# Patient Record
Sex: FEMALE | Race: ASIAN | NOT HISPANIC OR LATINO | Employment: OTHER | URBAN - METROPOLITAN AREA
[De-identification: names, ages, dates, MRNs, and addresses within clinical notes are randomized per-mention and may not be internally consistent; named-entity substitution may affect disease eponyms.]

---

## 2019-12-26 ENCOUNTER — RECORDS - HEALTHEAST (OUTPATIENT)
Dept: LAB | Facility: CLINIC | Age: 84
End: 2019-12-26

## 2019-12-26 LAB
ANION GAP SERPL CALCULATED.3IONS-SCNC: 7 MMOL/L (ref 5–18)
BUN SERPL-MCNC: 35 MG/DL (ref 8–28)
CALCIUM SERPL-MCNC: 10.4 MG/DL (ref 8.5–10.5)
CHLORIDE BLD-SCNC: 100 MMOL/L (ref 98–107)
CHOLEST SERPL-MCNC: 208 MG/DL
CO2 SERPL-SCNC: 28 MMOL/L (ref 22–31)
CREAT SERPL-MCNC: 1.37 MG/DL (ref 0.6–1.1)
ERYTHROCYTE [DISTWIDTH] IN BLOOD BY AUTOMATED COUNT: 12.3 % (ref 11–14.5)
FASTING STATUS PATIENT QL REPORTED: YES
GFR SERPL CREATININE-BSD FRML MDRD: 36 ML/MIN/1.73M2
GLUCOSE BLD-MCNC: 226 MG/DL (ref 70–125)
HBA1C MFR BLD: 9.5 % (ref 4.2–6.1)
HCT VFR BLD AUTO: 38.8 % (ref 35–47)
HDLC SERPL-MCNC: 40 MG/DL
HGB BLD-MCNC: 12.8 G/DL (ref 12–16)
LDLC SERPL CALC-MCNC: 122 MG/DL
MCH RBC QN AUTO: 28.4 PG (ref 27–34)
MCHC RBC AUTO-ENTMCNC: 33 G/DL (ref 32–36)
MCV RBC AUTO: 86 FL (ref 80–100)
PLATELET # BLD AUTO: 246 THOU/UL (ref 140–440)
PMV BLD AUTO: 9.5 FL (ref 8.5–12.5)
POTASSIUM BLD-SCNC: 4.6 MMOL/L (ref 3.5–5)
RBC # BLD AUTO: 4.5 MILL/UL (ref 3.8–5.4)
SODIUM SERPL-SCNC: 135 MMOL/L (ref 136–145)
TRIGL SERPL-MCNC: 232 MG/DL
TSH SERPL DL<=0.005 MIU/L-ACNC: 1.2 UIU/ML (ref 0.3–5)
WBC: 4.7 THOU/UL (ref 4–11)

## 2019-12-27 ENCOUNTER — RECORDS - HEALTHEAST (OUTPATIENT)
Dept: LAB | Facility: CLINIC | Age: 84
End: 2019-12-27

## 2019-12-27 LAB
ALBUMIN SERPL-MCNC: 3.2 G/DL (ref 3.5–5)
ALP SERPL-CCNC: 75 U/L (ref 45–120)
ALT SERPL W P-5'-P-CCNC: 11 U/L (ref 0–45)
ANION GAP SERPL CALCULATED.3IONS-SCNC: 10 MMOL/L (ref 5–18)
AST SERPL W P-5'-P-CCNC: 15 U/L (ref 0–40)
BILIRUB SERPL-MCNC: 0.5 MG/DL (ref 0–1)
BUN SERPL-MCNC: 34 MG/DL (ref 8–28)
CALCIUM SERPL-MCNC: 10.1 MG/DL (ref 8.5–10.5)
CHLORIDE BLD-SCNC: 99 MMOL/L (ref 98–107)
CO2 SERPL-SCNC: 28 MMOL/L (ref 22–31)
CREAT SERPL-MCNC: 1.2 MG/DL (ref 0.6–1.1)
GFR SERPL CREATININE-BSD FRML MDRD: 42 ML/MIN/1.73M2
GLUCOSE BLD-MCNC: 214 MG/DL (ref 70–125)
HBA1C MFR BLD: 9.4 % (ref 4.2–6.1)
POTASSIUM BLD-SCNC: 5.3 MMOL/L (ref 3.5–5)
PROT SERPL-MCNC: 7.2 G/DL (ref 6–8)
SODIUM SERPL-SCNC: 137 MMOL/L (ref 136–145)
TSH SERPL DL<=0.005 MIU/L-ACNC: 1.45 UIU/ML (ref 0.3–5)
VIT B12 SERPL-MCNC: 378 PG/ML (ref 213–816)

## 2020-01-06 ENCOUNTER — RECORDS - HEALTHEAST (OUTPATIENT)
Dept: LAB | Facility: CLINIC | Age: 85
End: 2020-01-06

## 2020-01-06 LAB
ANION GAP SERPL CALCULATED.3IONS-SCNC: 7 MMOL/L (ref 5–18)
BUN SERPL-MCNC: 32 MG/DL (ref 8–28)
CALCIUM SERPL-MCNC: 10.2 MG/DL (ref 8.5–10.5)
CHLORIDE BLD-SCNC: 97 MMOL/L (ref 98–107)
CO2 SERPL-SCNC: 31 MMOL/L (ref 22–31)
CREAT SERPL-MCNC: 1.29 MG/DL (ref 0.6–1.1)
GFR SERPL CREATININE-BSD FRML MDRD: 38 ML/MIN/1.73M2
GLUCOSE BLD-MCNC: 240 MG/DL (ref 70–125)
POTASSIUM BLD-SCNC: 4.1 MMOL/L (ref 3.5–5)
SODIUM SERPL-SCNC: 135 MMOL/L (ref 136–145)

## 2020-01-10 ENCOUNTER — RECORDS - HEALTHEAST (OUTPATIENT)
Dept: LAB | Facility: CLINIC | Age: 85
End: 2020-01-10

## 2020-01-13 LAB
ANION GAP SERPL CALCULATED.3IONS-SCNC: 7 MMOL/L (ref 5–18)
BUN SERPL-MCNC: 36 MG/DL (ref 8–28)
CALCIUM SERPL-MCNC: 10.5 MG/DL (ref 8.5–10.5)
CHLORIDE BLD-SCNC: 98 MMOL/L (ref 98–107)
CO2 SERPL-SCNC: 32 MMOL/L (ref 22–31)
CREAT SERPL-MCNC: 1.38 MG/DL (ref 0.6–1.1)
GFR SERPL CREATININE-BSD FRML MDRD: 36 ML/MIN/1.73M2
GLUCOSE BLD-MCNC: 196 MG/DL (ref 70–125)
POTASSIUM BLD-SCNC: 4.6 MMOL/L (ref 3.5–5)
SODIUM SERPL-SCNC: 137 MMOL/L (ref 136–145)

## 2020-01-20 ENCOUNTER — MEDICAL CORRESPONDENCE (OUTPATIENT)
Dept: HEALTH INFORMATION MANAGEMENT | Facility: CLINIC | Age: 85
End: 2020-01-20

## 2020-03-20 ENCOUNTER — APPOINTMENT (OUTPATIENT)
Dept: GENERAL RADIOLOGY | Facility: CLINIC | Age: 85
End: 2020-03-20
Attending: PHYSICIAN ASSISTANT
Payer: COMMERCIAL

## 2020-03-20 ENCOUNTER — HOSPITAL ENCOUNTER (EMERGENCY)
Facility: CLINIC | Age: 85
Discharge: NURSING, REHAB OR LONG-TERM CARE | End: 2020-03-20
Attending: PHYSICIAN ASSISTANT | Admitting: PHYSICIAN ASSISTANT
Payer: COMMERCIAL

## 2020-03-20 ENCOUNTER — APPOINTMENT (OUTPATIENT)
Dept: CT IMAGING | Facility: CLINIC | Age: 85
End: 2020-03-20
Attending: PHYSICIAN ASSISTANT
Payer: COMMERCIAL

## 2020-03-20 VITALS
OXYGEN SATURATION: 96 % | TEMPERATURE: 98 F | DIASTOLIC BLOOD PRESSURE: 65 MMHG | HEART RATE: 78 BPM | RESPIRATION RATE: 16 BRPM | SYSTOLIC BLOOD PRESSURE: 116 MMHG

## 2020-03-20 DIAGNOSIS — S70.12XA HEMATOMA OF LEFT ILIOPSOAS MUSCLE, INITIAL ENCOUNTER: ICD-10-CM
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
## 2020-03-20 PROCEDURE — 25000132 ZZH RX MED GY IP 250 OP 250 PS 637: Performed by: PHYSICIAN ASSISTANT

## 2020-03-20 PROCEDURE — 72040 X-RAY EXAM NECK SPINE 2-3 VW: CPT

## 2020-03-20 PROCEDURE — 99285 EMERGENCY DEPT VISIT HI MDM: CPT | Mod: 25

## 2020-03-20 PROCEDURE — 73502 X-RAY EXAM HIP UNI 2-3 VIEWS: CPT

## 2020-03-20 PROCEDURE — 70450 CT HEAD/BRAIN W/O DYE: CPT

## 2020-03-20 PROCEDURE — 71045 X-RAY EXAM CHEST 1 VIEW: CPT

## 2020-03-20 PROCEDURE — 72192 CT PELVIS W/O DYE: CPT

## 2020-03-20 RX ORDER — HYDROCODONE BITARTRATE AND ACETAMINOPHEN 5; 325 MG/1; MG/1
2 TABLET ORAL ONCE
Status: COMPLETED | OUTPATIENT
Start: 2020-03-20 | End: 2020-03-20

## 2020-03-20 RX ADMIN — HYDROCODONE BITARTRATE AND ACETAMINOPHEN 2 TABLET: 5; 325 TABLET ORAL at 17:27

## 2020-03-20 ASSESSMENT — ENCOUNTER SYMPTOMS
NECK PAIN: 0
HEADACHES: 0
ABDOMINAL PAIN: 0

## 2020-03-20 NOTE — ED AVS SNAPSHOT
[facility] Emergency Department  [address]  [facility] [address]  Phone:  [phone]  Fax:  [phone]                                    Deborah Zhou   MRN: [id number]    Department:  [facility] Emergency Department   Date of Visit:  3/20/2020           After Visit Summary Signature Page    I have received my discharge instructions, and my questions have been answered. I have discussed any challenges I see with this plan with the nurse or doctor.    ..........................................................................................................................................  Patient/Patient Representative Signature      ..........................................................................................................................................  Patient Representative Print Name and Relationship to Patient    ..................................................               ................................................  Date                                   Time    ..........................................................................................................................................  Reviewed by Signature/Title    ...................................................              ..............................................  Date                                               Time          22EPIC Rev 08/18

## 2020-03-20 NOTE — ED NOTES
Bed: ED10  Expected date: 3/20/20  Expected time: 3:02 PM  Means of arrival: Ambulance  Comments:  Shanta Garcia3- Fall hip

## 2020-03-20 NOTE — ED PROVIDER NOTES
History   Chief Complaint:  Fall     The history is provided by the EMS personnel and the patient.      Deborah Zhou is a 89 year old female with history of dementia and osteoporosis who presents with fall. EMS reports that the patient had a fall at her group home about 1 hours ago that was unwitnessed. She did not hit her head. He denies neck pain, back pain, abdominal pain, chest pain, or any other pain beyond her left hip. Staff gave the patient Tylenol and called EMS.     Allergies:  Gabapentin    Medications:   Amlodipine Besylate  Glipizide  Metformin  Metoprolol succinate  Naproxen   Senna docusate  Miralax     Past Medical History:    Essential hypertension  Wound infection  Type 2 diabetes mellitus  Chronic kidney disease    Posterior vitreous detachment   Osteoporosis   Hyperlipidemia  Dementia    Past Surgical History:    Extracapsular cataract bilateral     Family History:   History reviewed. No pertinent family history.    Social History:  Smoking Status: Never Smoker  Smokeless Tobacco: Never Used  Alcohol Use: Yes    Review of Systems   Cardiovascular: Negative for chest pain.   Gastrointestinal: Negative for abdominal pain.   Musculoskeletal: Negative for neck pain.        Left hip pain   Neurological: Negative for headaches.   All other systems reviewed and are negative.    Physical Exam     Patient Vitals for the past 24 hrs:   BP Temp Temp src Pulse Resp SpO2   03/20/20 1745 -- -- -- -- -- 95 %   03/20/20 1730 -- -- -- -- -- 96 %   03/20/20 1528 (!) 145/73 98  F (36.7  C) Oral 85 18 97 %       Physical Exam  Vitals signs and nursing note reviewed.   Constitutional:       General: She is not in acute distress.     Appearance: She is not diaphoretic.   HENT:      Head: Atraumatic.      Mouth/Throat:      Pharynx: No oropharyngeal exudate.   Eyes:      General: No scleral icterus.  Neck:      Musculoskeletal: No muscular tenderness.      Comments: No tenderness or pain  Cardiovascular:      Pulses:  Normal pulses.   Pulmonary:      Effort: Pulmonary effort is normal. No respiratory distress.   Abdominal:      Palpations: Abdomen is soft.      Tenderness: There is no abdominal tenderness.   Musculoskeletal:         General: Tenderness and signs of injury present. No swelling or deformity.      Comments: No visible evidence of injury at this time. There is pain/tenderness of the L pubis. Baseline ROM, strength, sensation of the BUE and BLE at all major joints. 2+ radial, DP, and femoral pulses.    Skin:     General: Skin is warm.      Findings: No rash.   Neurological:      Mental Status: She is alert. Mental status is at baseline.         Emergency Department Course   Imaging:  Radiology findings were communicated with the patient and daughter over the phone who voiced understanding of the findings.    Head CT w/o contrast  1.  No evidence of acute intracranial hemorrhage or mass effect.  2.  Moderate nonspecific white matter changes.  3.  Moderate brain parenchymal volume loss.  Reading per radiology    CT Pelvis Bone wo Contrast  1.  Acute intramuscular hematoma involving the iliopsoas muscles (the iliacus greater than the psoas). These are commonly seen in patients who are on anticoagulation therapy, related to mild muscle strain. There is no evidence of tendon rupture.  2.  No acute abnormality in the left hip or pelvis otherwise.  3.  No acute fracture or bone lesion.  4.  Bipolar left hip hemiarthroplasty, without evidence of complication.   5.  The hip joints and the pelvic ring are normally aligned.  6.  Chronic advanced degenerative changes in the lower lumbar spine and SI joints, asymmetric to the left in the presence of lumbar levoscoliosis.  7.  Heavy atherosclerosis throughout the arteries of the pelvis and hips.  Reading per radiology    Cervical spine XR, 2-3 views  There is approximately 3-4 mm of anterolisthesis of C3 on  C4. There is 3 mm of retrolisthesis of C5 on C6 and 1 mm  retrolisthesis  of C6 on C7. There is mild reversal of the normal  cervical lordosis centered at the C4-C5 level. No gross vertebral body  height loss. Multilevel degenerative disc disease, including severe  disc height loss at C5-C6 and C6-C7. Multilevel anterior endplate and  uncovertebral osteophytes are present. There is multilevel facet  arthropathy. The prevertebral soft tissues are within normal limits.  Significant atherosclerosis involving the thoracic aorta.    If there is ongoing clinical concern for acute cervical spine  traumatic injury, recommend CT, as radiographs are insensitive.  Reading per radiology     XR Pelvis and Hip Left 2 Views  1.  No definite pelvic or left hip fracture.  2.  Bipolar left hip hemiarthroplasty without complication.  3.  Mild cortical irregularity along the inferior aspect of the  inferior pubic ramus, without a definite fracture lucency, cortical  step-off, or corresponding superior pubic ramus fracture. However, if  the patient has concerning clinical findings, pelvis CT could more  completely/sensitively evaluate for nondisplaced fractures.  Reading per radiology     XR Chest 1 View  Minimal bibasilar scarring or subsegmental atelectasis. No  pleural effusion, pneumothorax or focal consolidation. Cardiac and  mediastinal silhouettes unremarkable. Calcified atheromatous plaque of  the thoracic aorta. Dextroconvex thoracolumbar scoliosis and  osteopenia.  Reading per radiology     Interventions:  1727 Norco 5-325 mg 2 tablets Oral     Emergency Department Course:  Nursing notes and vitals reviewed.    1550 I performed an exam of the patient as documented above.     The patient was sent for a Chest XR, XR Pelvis and Hip Left, Cervical Spine XR while in the emergency department, results above.      1644 I called the patient's daughter and relayed her Xray findings. She would like the patient to be admitted.     1654 I rechecked the patient. Explained findings to the Patient.    1741 I spoke  with Dr. Hung of the Hospitalist service from [facility] regarding patient's presentation, findings, and plan of care.     The patient was sent for a CT Head and CT Pelvis while in the emergency department, results above.      1810 I spoke with Dr. Hung of the Hospitalist service from [facility] regarding patient's presentation, findings, and plan of care.     1820 I returned to update the patient.     Findings and plan explained to the Patient and daughter. Patient discharged home with instructions regarding supportive care, medications, and reasons to return. The importance of close follow-up was reviewed.     Impression & Plan    Medical Decision Making:  Deborah Zhou is a 89 year old female who presents to the emergency department today for evaluation of a fall. She is a compromised historian who presents from a group home. She does have some recollection of the events. She voices having pain purely of the L pelvis. There is low suspicion for an acute head or cervical spine injury so initially plain film imaging of the cervical spine was performed. This was unlikely to demonstrate acute injury however CT recommended. Pelvic films demonstrate suspicious changes for inferior pubic rami fracture which clinically appears likely. CT imaging was obtained to ensure fracture. This was unremarkable. Prior to CT imaging, patient initially was planned for 23 hour observation, pain control, observation, after discussion with family. Upon learning of unremarkable traumatic imaging there is no indication for observation and discharged to facility where she presently resides.     Diagnosis:    ICD-10-CM    1. Fall, initial encounter  W19.XXXA    2. Hematoma of left iliopsoas muscle, initial encounter  S70.12XA        Disposition:   The patient is discharged to home.     Scribe Disclosure:  Joanie HUTCHISON, am serving as a scribe at 3:40 PM on 3/20/2020 to document services personally performed by Russell  Douglas Galeas PA-C based on my observations and the provider's statements to me.   [facility] EMERGENCY DEPARTMENT       Douglas Wells PA-C  03/20/20 1929

## 2020-03-20 NOTE — ED NOTES
[facility]  ED Nurse Handoff Report    Deborah Zhou is a 89 year old female   ED Chief complaint: Fall  . ED Diagnosis:   Final diagnoses:   Fall, initial encounter   Closed fracture of left inferior pubic ramus, initial encounter (H)     Allergies:   Allergies   Allergen Reactions     Gabapentin        Code Status: Full Code  Activity level - Baseline/Home:  Independent. Activity Level - Current:  Assist X 1 Lift room needed: No. Bariatric: No   Needed: Kiswahili  Isolation: No. Infection: Not Applicable.     Vital Signs:   Vitals:    03/20/20 1528   BP: (!) 145/73   Pulse: 85   Resp: 18   Temp: 98  F (36.7  C)   TempSrc: Oral   SpO2: 97%       Cardiac Rhythm:  ,      Pain level:    Patient confused: Yes. Patient Falls Risk: Yes.   Elimination Status: Has voided   Patient Report - Initial Complaint: Fall (left hip pain) Focused Assessment: Cognitive - Cognitive/Neuro/Behavioral WDL: orientation; speech; mood/behavior  Orientation: situation; person  Speech: clear; spontaneous  Best Language: 0 - No aphasia  Mood/Behavior: calm; cooperative   Annalisa Coma Scale - Best Eye Response: 4-->(E4) spontaneous  Best Motor Response: 6-->(M6) obeys commands  Best Verbal Response: 4-->(V4) confused  Annalisa Coma Scale Score: 14   Musculoskeletal - CMS Intact: Yes  Musculoskeletal Comment:  (Bilateral posterior tibial pulses +2)  Tests Performed: Labs Ordered and Resulted from Time of ED Arrival Up to the Time of Departure from the ED - No data to display    Treatments provided: PO [place]    Family Comments: Byself  OBS brochure/video discussed/provided to patient:  N/A  ED Medications:   Medications   HYDROcodone-acetaminophen (NORCO) 5-325 MG per tablet 2 tablet (2 tablets Oral Given 3/20/20 1727)     Drips infusing:  No  For the majority of the shift, the patient's behavior Green. Interventions performed were N/A.    Sepsis treatment initiated: No       ED Nurse Name/Phone Number: Sammy Berkowitz RN,    5:06 PM

## 2020-03-20 NOTE — ED TRIAGE NOTES
Pt with Hx of mild cognitive impairment. ABCs intact. Pt is alert. Comes from group home due to unwitnessed fall about an hour ago. Staff gave tylenol. EMS reports left hip pain. Pt denies hitting her head. Family wants to get checked.

## 2020-03-21 NOTE — ED NOTES
Patient discharged back to group home via daughter. Pt transferred to daughter's car via wheelchair. Discharge instructions and education completed with patient and her daughter. Report called to group home staff as well who verbalize understanding. Patient discharged in stable condition. ABCs intact. A&OX4.

## 2020-08-31 ENCOUNTER — MEDICAL CORRESPONDENCE (OUTPATIENT)
Dept: HEALTH INFORMATION MANAGEMENT | Facility: CLINIC | Age: 85
End: 2020-08-31

## 2021-06-16 ENCOUNTER — HOSPITAL ENCOUNTER (OUTPATIENT)
Facility: CLINIC | Age: 86
Setting detail: OBSERVATION
Discharge: HOME | End: 2021-06-17
Attending: PHYSICIAN ASSISTANT | Admitting: INTERNAL MEDICINE
Payer: COMMERCIAL

## 2021-06-16 ENCOUNTER — APPOINTMENT (OUTPATIENT)
Dept: CT IMAGING | Facility: CLINIC | Age: 86
End: 2021-06-16
Attending: PHYSICIAN ASSISTANT
Payer: COMMERCIAL

## 2021-06-16 DIAGNOSIS — M62.81 GENERALIZED MUSCLE WEAKNESS: ICD-10-CM

## 2021-06-16 DIAGNOSIS — R42 LIGHT HEADEDNESS: ICD-10-CM

## 2021-06-16 LAB
ALBUMIN SERPL-MCNC: 3.2 G/DL (ref 3.4–5)
ALBUMIN UR-MCNC: NEGATIVE MG/DL
ALP SERPL-CCNC: 62 U/L (ref 40–150)
ALT SERPL W P-5'-P-CCNC: 22 U/L (ref 0–50)
ANION GAP SERPL CALCULATED.3IONS-SCNC: 6 MMOL/L (ref 3–14)
APPEARANCE UR: CLEAR
AST SERPL W P-5'-P-CCNC: 16 U/L (ref 0–45)
BASOPHILS # BLD AUTO: 0 10E9/L (ref 0–0.2)
BASOPHILS NFR BLD AUTO: 0.7 %
BILIRUB SERPL-MCNC: 0.5 MG/DL (ref 0.2–1.3)
BILIRUB UR QL STRIP: NEGATIVE
BUN SERPL-MCNC: 24 MG/DL (ref 7–30)
CALCIUM SERPL-MCNC: 9.3 MG/DL (ref 8.5–10.1)
CHLORIDE SERPL-SCNC: 103 MMOL/L (ref 94–109)
CO2 SERPL-SCNC: 27 MMOL/L (ref 20–32)
COLOR UR AUTO: ABNORMAL
CREAT SERPL-MCNC: 1 MG/DL (ref 0.52–1.04)
DIFFERENTIAL METHOD BLD: NORMAL
EOSINOPHIL # BLD AUTO: 0.1 10E9/L (ref 0–0.7)
EOSINOPHIL NFR BLD AUTO: 1.7 %
ERYTHROCYTE [DISTWIDTH] IN BLOOD BY AUTOMATED COUNT: 12.1 % (ref 10–15)
GFR SERPL CREATININE-BSD FRML MDRD: 47 ML/MIN/{1.73_M2}
GLUCOSE SERPL-MCNC: 132 MG/DL (ref 70–99)
GLUCOSE UR STRIP-MCNC: NEGATIVE MG/DL
HCT VFR BLD AUTO: 41.1 % (ref 35–47)
HGB BLD-MCNC: 13.9 G/DL (ref 11.7–15.7)
HGB UR QL STRIP: NEGATIVE
HYALINE CASTS #/AREA URNS LPF: 1 /LPF (ref 0–2)
IMM GRANULOCYTES # BLD: 0 10E9/L (ref 0–0.4)
IMM GRANULOCYTES NFR BLD: 0.3 %
INTERPRETATION ECG - MUSE: NORMAL
KETONES UR STRIP-MCNC: NEGATIVE MG/DL
LABORATORY COMMENT REPORT: NORMAL
LEUKOCYTE ESTERASE UR QL STRIP: NEGATIVE
LYMPHOCYTES # BLD AUTO: 1.6 10E9/L (ref 0.8–5.3)
LYMPHOCYTES NFR BLD AUTO: 27.5 %
MCH RBC QN AUTO: 29 PG (ref 26.5–33)
MCHC RBC AUTO-ENTMCNC: 33.8 G/DL (ref 31.5–36.5)
MCV RBC AUTO: 86 FL (ref 78–100)
MONOCYTES # BLD AUTO: 0.4 10E9/L (ref 0–1.3)
MONOCYTES NFR BLD AUTO: 7.7 %
MUCOUS THREADS #/AREA URNS LPF: PRESENT /LPF
NEUTROPHILS # BLD AUTO: 3.6 10E9/L (ref 1.6–8.3)
NEUTROPHILS NFR BLD AUTO: 62.1 %
NITRATE UR QL: NEGATIVE
NRBC # BLD AUTO: 0 10*3/UL
NRBC BLD AUTO-RTO: 0 /100
PH UR STRIP: 6 PH (ref 5–7)
PLATELET # BLD AUTO: 242 10E9/L (ref 150–450)
POTASSIUM SERPL-SCNC: 3.8 MMOL/L (ref 3.4–5.3)
PROT SERPL-MCNC: 7.6 G/DL (ref 6.8–8.8)
RBC # BLD AUTO: 4.8 10E12/L (ref 3.8–5.2)
RBC #/AREA URNS AUTO: <1 /HPF (ref 0–2)
SARS-COV-2 RNA RESP QL NAA+PROBE: NEGATIVE
SODIUM SERPL-SCNC: 136 MMOL/L (ref 133–144)
SOURCE: ABNORMAL
SP GR UR STRIP: 1 (ref 1–1.03)
SPECIMEN SOURCE: NORMAL
SQUAMOUS #/AREA URNS AUTO: <1 /HPF (ref 0–1)
TROPONIN I SERPL-MCNC: <0.015 UG/L (ref 0–0.04)
TSH SERPL DL<=0.005 MIU/L-ACNC: 1.16 MU/L (ref 0.4–4)
UROBILINOGEN UR STRIP-MCNC: NORMAL MG/DL (ref 0–2)
WBC # BLD AUTO: 5.7 10E9/L (ref 4–11)
WBC #/AREA URNS AUTO: 3 /HPF (ref 0–5)

## 2021-06-16 PROCEDURE — 99285 EMERGENCY DEPT VISIT HI MDM: CPT | Mod: 25

## 2021-06-16 PROCEDURE — G0378 HOSPITAL OBSERVATION PER HR: HCPCS

## 2021-06-16 PROCEDURE — 85025 COMPLETE CBC W/AUTO DIFF WBC: CPT | Performed by: PHYSICIAN ASSISTANT

## 2021-06-16 PROCEDURE — 84484 ASSAY OF TROPONIN QUANT: CPT | Performed by: PHYSICIAN ASSISTANT

## 2021-06-16 PROCEDURE — 81001 URINALYSIS AUTO W/SCOPE: CPT | Performed by: PHYSICIAN ASSISTANT

## 2021-06-16 PROCEDURE — 99220 PR INITIAL OBSERVATION CARE,LEVEL III: CPT | Performed by: INTERNAL MEDICINE

## 2021-06-16 PROCEDURE — 87635 SARS-COV-2 COVID-19 AMP PRB: CPT | Performed by: PHYSICIAN ASSISTANT

## 2021-06-16 PROCEDURE — 93005 ELECTROCARDIOGRAM TRACING: CPT

## 2021-06-16 PROCEDURE — 84443 ASSAY THYROID STIM HORMONE: CPT | Performed by: PHYSICIAN ASSISTANT

## 2021-06-16 PROCEDURE — 80053 COMPREHEN METABOLIC PANEL: CPT | Performed by: PHYSICIAN ASSISTANT

## 2021-06-16 PROCEDURE — 93005 ELECTROCARDIOGRAM TRACING: CPT | Mod: 76

## 2021-06-16 PROCEDURE — 70450 CT HEAD/BRAIN W/O DYE: CPT

## 2021-06-16 PROCEDURE — C9803 HOPD COVID-19 SPEC COLLECT: HCPCS

## 2021-06-16 RX ORDER — METOPROLOL SUCCINATE 100 MG/1
100 TABLET, EXTENDED RELEASE ORAL DAILY
Status: DISCONTINUED | OUTPATIENT
Start: 2021-06-17 | End: 2021-06-17 | Stop reason: HOSPADM

## 2021-06-16 RX ORDER — ONDANSETRON 4 MG/1
4 TABLET, ORALLY DISINTEGRATING ORAL EVERY 6 HOURS PRN
Status: DISCONTINUED | OUTPATIENT
Start: 2021-06-16 | End: 2021-06-17 | Stop reason: HOSPADM

## 2021-06-16 RX ORDER — GLIPIZIDE 5 MG/1
5 TABLET, FILM COATED, EXTENDED RELEASE ORAL DAILY
Status: DISCONTINUED | OUTPATIENT
Start: 2021-06-17 | End: 2021-06-17

## 2021-06-16 RX ORDER — ACETAMINOPHEN 650 MG/1
650 SUPPOSITORY RECTAL EVERY 4 HOURS PRN
Status: DISCONTINUED | OUTPATIENT
Start: 2021-06-16 | End: 2021-06-17 | Stop reason: HOSPADM

## 2021-06-16 RX ORDER — ACETAMINOPHEN 325 MG/1
650 TABLET ORAL EVERY 4 HOURS PRN
Status: DISCONTINUED | OUTPATIENT
Start: 2021-06-16 | End: 2021-06-17 | Stop reason: HOSPADM

## 2021-06-16 RX ORDER — ONDANSETRON 2 MG/ML
4 INJECTION INTRAMUSCULAR; INTRAVENOUS EVERY 6 HOURS PRN
Status: DISCONTINUED | OUTPATIENT
Start: 2021-06-16 | End: 2021-06-17 | Stop reason: HOSPADM

## 2021-06-16 RX ORDER — METFORMIN HCL 500 MG
500 TABLET, EXTENDED RELEASE 24 HR ORAL DAILY
Status: DISCONTINUED | OUTPATIENT
Start: 2021-06-17 | End: 2021-06-17

## 2021-06-16 RX ORDER — AMLODIPINE BESYLATE 10 MG/1
10 TABLET ORAL DAILY
Status: DISCONTINUED | OUTPATIENT
Start: 2021-06-17 | End: 2021-06-17 | Stop reason: HOSPADM

## 2021-06-16 ASSESSMENT — COLUMBIA-SUICIDE SEVERITY RATING SCALE - C-SSRS
2. HAVE YOU ACTUALLY HAD ANY THOUGHTS OF KILLING YOURSELF IN THE PAST MONTH?: NO
1. IN THE PAST MONTH, HAVE YOU WISHED YOU WERE DEAD OR WISHED YOU COULD GO TO SLEEP AND NOT WAKE UP?: NO

## 2021-06-16 ASSESSMENT — MIFFLIN-ST. JEOR: SCORE: 909.78

## 2021-06-16 NOTE — H&P
[facility]    History and Physical  Hospitalist       Date of Admission:  6/16/2021    Assessment & Plan   Deborah Zhou is a 89 year old female who presents with dizziness, in the form of spinning sensation, and generalized weakness.    This is a 89-year-old Indonesian speaking lady.  The history is limited due to her cognitive dysfunction, language barrier and very hard of hearing.   is in the room who helped.  Overall she was able to give a decent history about today's symptoms.    She lives at a group home.  At baseline she appears to be fairly independent.  Per patient, her activity is limited by her bilateral knee pain especially on the left side.  At baseline she uses a walker and walks independently.    Last night when she went to bed she was feeling normal.  This morning after she woke up, she tried to get out of bed but she felt very dizzy.  She describes this dizziness as if the entire room was spinning.  She also felt generally weak.  She could not get out of bed because of that.  She denies any weakness one side or the other.  Denies any visual symptoms.  Denies any trouble swallowing.  She does not appear to have any dysarthria.  She was brought into the emergency room by EMS.    She denies any fever/chills/cough.  Denies any urinary symptoms.  Denies any abdominal pain/nausea/vomiting/diarrhea.  Denies any recent ear infection.  She reports occasional frontal headache.    She underwent further evaluation in the emergency room.  Her work-up is essentially unremarkable.  Labs are unremarkable.  Normal urinalysis.  Underwent CT head without contrast with no acute abnormality.  But she continues to feel weak.  The nursing staff try to get her out of bed and she could not walk because of weakness.  For that reason medicine team was consulted for admission and further care.    She denies any previous similar symptoms.    Past medical history includes hypertension, diabetes.  No recent hospital admission.    Problem List:    Dizziness in form of spinning sensation.  Generalized weakness.  -Patient is able to describe the symptoms fairly well.  Although the history is somewhat limited due to the language barrier ( used), cognitive dysfunction and she being hard of hearing.  -Currently she denies any dizziness or spinning sensation.   -On exam, does not have any other worrisome symptoms or signs.  She has no nystagmus on exam.  Does not appear to have any issues with coordination which is slightly limited by her osteoarthritis and age.  -Appears to be benign in nature.  -No other associated symptoms such as dysarthria/dysphagia/visual disturbance.  -Admission to observation unit.  -PT and OT consultations in the morning.  -If symptoms recur, consider MRI of the brain.  -Check orthostatic vitals.    Hypertension  -On amlodipine and metoprolol at baseline, continue    Diabetes  -On Metformin and glipizide, continue      DVT Prophylaxis: Low Risk/Ambulatory with no VTE prophylaxis indicated  Code Status: Full Code    Luc Barakat MD    Primary Care Physician   [facility]    Chief Complaint   Generalized weakness, spinning sensation      History of Present Illness   Deborah Zhou is a 89 year old female presented with generalized weakness and spinning sensation.      Past Medical History    Hypertension  Osteoarthritis  Diabetes      Past Surgical History   I have reviewed this patient's surgical history and updated it with pertinent information if needed.  No past surgical history on file.    Prior to Admission Medications   Prior to Admission Medications   Prescriptions Last Dose Informant Patient Reported? Taking?   ACETAMINOPHEN PO   Yes No   Sig: Take 1,000 mg by mouth 3 times daily   AMLODIPINE BESYLATE PO   Yes No   Sig: Take 10 mg by mouth daily   METFORMIN HCL ER PO   Yes No   Sig: Take 500 mg by mouth daily   METOPROLOL SUCCINATE ER PO   Yes No   Sig: Take  100 mg by mouth daily   ORDER FOR DME   No No   Sig: Equipment being ordered: post op shoe   OXYCODONE HCL PO   Yes No   Sig: Take 2.5-5 mg by mouth every 4 hours as needed   VITAMIN D, CHOLECALCIFEROL, PO   Yes No   Sig: Take 2,000 Units by mouth daily   glipiZIDE (GLUCOTROL XL) 2.5 MG 24 hr tablet   Yes No   Sig: Take 5 mg by mouth daily   naproxen (NAPROSYN) 375 MG tablet   Yes No   Sig: Take 375 mg by mouth 2 times daily (with meals)   polyethylene glycol (MIRALAX/GLYCOLAX) powder   Yes No   Sig: Take 34 g by mouth daily   senna-docusate (SENOKOT-S;PERICOLACE) 8.6-50 MG per tablet   Yes No   Sig: Take 2 tablets by mouth nightly as needed for constipation      Facility-Administered Medications: None     Allergies   Allergies   Allergen Reactions     Gabapentin        Social History   I have reviewed this patient's social history and updated it with pertinent information if needed. Deborah Zhou  reports that she has never smoked. She has never used smokeless tobacco.    Family History   Denies any significant medical issues.    Review of Systems   The 10 point Review of Systems is negative other than noted in the HPI or here.     Physical Exam   Temp: 97.7  F (36.5  C) Temp src: Oral BP: 127/60 Pulse: 66   Resp: 17 SpO2: 97 % O2 Device: None (Room air)    Vital Signs with Ranges  Temp:  [97.7  F (36.5  C)] 97.7  F (36.5  C)  Pulse:  [65-72] 66  Resp:  [17] 17  BP: (127-141)/(60-79) 127/60  SpO2:  [93 %-99 %] 97 %  0 lbs 0 oz    Constitutional: Awake, alert, cooperative, no apparent distress.  Eyes: Conjunctiva and pupils examined and normal.  Evidence of cataract.  HEENT: Moist mucous membranes, normal dentition.  Respiratory: Clear to auscultation bilaterally, no crackles or wheezing.  Cardiovascular: Regular rate and rhythm, normal S1 and S2, and no murmur noted.  GI: Soft, non-distended, non-tender, normal bowel sounds.  Skin: No rashes, no cyanosis, no edema.  Musculoskeletal: No joint swelling, erythema or  tenderness.  Neurologic: Cranial nerves 2-12 intact, no nystagmus.  Speech and language appears to be normal, per .  Equal strength bilaterally.  Difficulty raising her lower extremities due to arthritis and stiffness associated with that.  Finger-nose  test unremarkable.  Heel shin test also unremarkable although limited by her osteoarthritis.  Psychiatric: Alert.  She knows that she is in the hospital but unable to tell me the name of the hospital.  Cannot tell me the year, date, month.    Data     Recent Labs   Lab 06/16/21  1150   WBC 5.7   HGB 13.9   MCV 86         POTASSIUM 3.8   CHLORIDE 103   CO2 27   BUN 24   CR 1.00   ANIONGAP 6   RADHA 9.3   *   ALBUMIN 3.2*   PROTTOTAL 7.6   BILITOTAL 0.5   ALKPHOS 62   ALT 22   AST 16   TROPI <0.015       Recent Results (from the past 24 hour(s))   Head CT w/o contrast    Narrative    CT SCAN OF THE HEAD WITHOUT CONTRAST   6/16/2021 2:37 PM     HISTORY: Dizziness, non-specific.    TECHNIQUE:  Axial images of the head and coronal reformations without  IV contrast material. Radiation dose for this scan was reduced using  automated exposure control, adjustment of the mA and/or kV according  to patient size, or iterative reconstruction technique.    COMPARISON: 3/20/2020    FINDINGS:     Intracranial contents: There is mild-to-moderate cerebral volume loss.  Patchy and confluent low-attenuation in the white matter is  nonspecific, though likely due to moderate-to-severe small-vessel  ischemic disease. There is no evidence of intracranial hemorrhage,  mass, acute infarct or anomaly.    Visualized orbits/sinuses/mastoids:  The visualized portions of the  sinuses and mastoids appear normal.    Osseous structures/soft tissues:  There is no evidence of trauma.      Impression    IMPRESSION:   1. No acute findings.  2. Moderate-to-severe small vessel disease.     FLORY BROOKS MD

## 2021-06-16 NOTE — PLAN OF CARE
ROOM # 224    Living Situation (if not independent, order SW consult): [facility]  Facility name: [facility] in [place]  : Dinorah (daughter) [phone]    Activity level at baseline: Ind w/ walker  Activity level on admit: Ax2      Patient registered to observation; given Patient Bill of Rights; given the opportunity to ask questions about observation status and their plan of care.  Patient has been oriented to the observation room, bathroom and call light is in place.    Discussed discharge goals and expectations with patient/family.

## 2021-06-16 NOTE — ED NOTES
Pt's caregiver from nursing home called. Reported that pt ambulates independently with walker. Caregiver reports pt has been eating and drinking well lately and the only concern they had was that the pt was unable to ambulate this morning. Caregiver is given update on pt's plan of care. Caregiver acknowledges.

## 2021-06-16 NOTE — ED PROVIDER NOTES
Emergency Department Attending Supervision Note  6/16/2021  4:26 PM      I evaluated this patient in conjunction with Jalil Young PA-C      Briefly, the patient presented from a group home with weakness and lightheadedness.  When trying to get out of her seat today she reported increasing lightheadedness.  She normally uses a walker to ambulate.  No reported headache, nausea, vomiting, chest pain, dysuria, shortness of breath.  She did complain of weakness in her legs though this is more chronic.    On my exam,     Nursing note and vitals reviewed.  Constitutional: Well nourished. Resting comfortably.   Eyes: Conjunctiva normal.  Pupils are equal, round, and reactive to light.   ENT: Nose normal. Mucous membranes pink and moist.    Neck: Normal range of motion.  CVS: Normal rate, regular rhythm.  Normal heart sounds.    Pulmonary: Lungs clear to auscultation bilaterally. No wheezes/rales/rhonchi.  GI: Abdomen soft. Nontender, nondistended. No rigidity or guarding.    MSK: No calf tenderness or swelling.  Neuro: Alert. Follows simple commands.  Moves all extremities equally and symmetrically  Skin: Skin is warm and dry. No rash noted. Generalized pallor  Psychiatric: Normal affect.         Patient Vitals for the past 24 hrs:   BP Temp Temp src Pulse Resp SpO2   06/16/21 1600 127/60 -- -- 66 -- 97 %   06/16/21 1535 -- -- -- 72 -- 99 %   06/16/21 1530 135/69 -- -- 68 -- --   06/16/21 1500 (!) 141/79 -- -- 66 -- 93 %   06/16/21 1400 129/64 -- -- 65 -- 93 %   06/16/21 1300 136/64 -- -- 66 -- 96 %   06/16/21 1245 130/67 97.7  F (36.5  C) Oral 66 17 98 %     Results:  Head CT w/o contrast   Preliminary Result   IMPRESSION:    1. No acute findings.   2. Moderate-to-severe small vessel disease.         Labs Ordered and Resulted from Time of ED Arrival Up to the Time of Departure from the ED   COMPREHENSIVE METABOLIC PANEL - Abnormal; Notable for the following components:       Result Value    Glucose 132 (*)     GFR  Estimate 47 (*)     GFR Estimate If Black 55 (*)     Albumin 3.2 (*)     All other components within normal limits   ROUTINE UA WITH MICROSCOPIC - Abnormal; Notable for the following components:    Mucous Urine Present (*)     All other components within normal limits   CBC WITH PLATELETS DIFFERENTIAL   SARS-COV-2 (COVID-19) VIRUS RT-PCR   TROPONIN I   TSH WITH FREE T4 REFLEX     ED course:  1630 I evaluated the patient.     Patient is a 89-year-old female presenting with predominant complaints of generalized weakness.  She is nontoxic, without obvious focal neuro deficits.  Formal CT head obtained though fortunately unremarkable.  I have a low clinical suspicion for vascular dissection or aneurysm.  I do not feel further emergent neuroimaging is warranted at this time.  EKG with first-degree AV block though no STEMI.  Screening troponin negative, she denies any active chest pain.  Labs without significant electrolyte derangements or profound anemia.  No evidence to suggest underlying infectious process today though family requesting hospitalization given profound weakness.  Stronger suspicion this is more 2/2 to deconditioning.  She was accepted by  hospitalist.    Diagnosis    ICD-10-CM    1. Light headedness  R42 Asymptomatic SARS-CoV-2 COVID-19 Virus (Coronavirus) by PCR   2. Generalized muscle weakness  M62.81     Resolved       Juliette Torres DO McDonald, Lindsey E, DO  06/16/21 1911

## 2021-06-16 NOTE — ED PROVIDER NOTES
"  History   Chief Complaint:  Generalized Weakness       HPI   Deborah Zhou is a 89 year old female with history of hypertension, diabetes, chronic kidney disease, edema presents from assisted living facility/group home.  The facility reports that the patient did not want to get out of bed this morning which is unusual for her.  When they try to set her up she complained of feeling dizzy and lightheaded.  She normally uses a walker to ambulate.  Here the patient denies any pain and states that she feels \"tired\".  She does have weakness in the left leg but this is not new per the patient.  She denies, headache, neck pain, vision loss, numbness, weakness, cough, vomiting, diarrhea, chest pain, or shortness of breath.      Review of Systems   All other systems reviewed and are negative.      Allergies:  Gabapentin     Medications:  Metformin   Metoprolol   Norvasc   Lancets     Past Medical History:    Hypertension   Type 2 diabetes   CKD   Wound dehiscence   Lower extremity edema    Recurrent falls   MCI   varicome veins   Hyperlipidemia   OA   Diabetes polyneuropathy     Past Surgical History:    Extracapsular cataract removal   Discission 2nd cataract     Social History:  Arrives via EMS    Physical Exam     Patient Vitals for the past 24 hrs:   BP Temp Temp src Pulse Resp SpO2   06/16/21 1725 -- -- -- 71 -- 95 %   06/16/21 1705 -- -- -- 72 -- 98 %   06/16/21 1700 129/70 -- -- -- -- --   06/16/21 1600 127/60 -- -- 66 -- 97 %   06/16/21 1535 -- -- -- 72 -- 99 %   06/16/21 1530 135/69 -- -- 68 -- --   06/16/21 1500 (!) 141/79 -- -- 66 -- 93 %   06/16/21 1400 129/64 -- -- 65 -- 93 %   06/16/21 1300 136/64 -- -- 66 -- 96 %   06/16/21 1245 130/67 97.7  F (36.5  C) Oral 66 17 98 %       Physical Exam  General: Awake, alert, pleasant, non-toxic.  Head:  Scalp is NC/AT  Eyes:  Conjunctiva normal, PERRL  ENT:  The external nose and ears are normal.     Oropharynx clear  Neck:  Normal range of motion without " rigidity.  CV:  Regular rate and rhythm    No pathologic murmur, rubs, or gallops.  Resp:  Breath sounds are clear bilaterally.  No crackles, wheezes, rhonchi, stridor.    Non-labored, no retractions or accessory muscle use  Abdomen: Abdomen is soft, no distension, no tenderness, no masses. No CVA tenderness.  MS:  No lower extremity edema or asymmetric calf swelling. Normal ROM in all joints without effusions.    No midline cervical, thoracic, or lumbar tenderness  Skin:  Warm and dry, No rash or lesions noted. 2+ peripheral pulses in all extremities  Neuro: Alert and oriented x3.  No gross motor deficits.  No facial asymmetry. 4/5 LLE strength (baseline per pt).  Otherwise 5/5 strength in BL UE and RLE.  CNII-XII intact.  Normal finger to nose testing.  Psych: Awake. Alert. Normal affect. Appropriate interactions.      Emergency Department Course     ECG  ECG taken at 11:11:11, ECG read at 1126  Sinus rhythm with 1st degree AV block  Low voltage QRS  Borderline ECG.   Rate 72 bpm. TN interval 212 ms. QRS duration 88 ms. QT/QTc 432/473 ms. P-R-T axes 78 39 81.     ECG #2  ECG taken at 1710, ECG read at 1715  Sinus rhythm with 1st degree AV block  Otherwise normal ECG  Rate 72 bpm. TN interval 218 ms. QRS duration 86 ms. QT/QTc 430/470 ms. P-R-T axes 80 38 78.      Imaging:  Head CT w/o contrast   Final Result   IMPRESSION:    1. No acute findings.   2. Moderate-to-severe small vessel disease.       FLORY BROOKS MD          Laboratory:  Labs Ordered and Resulted from Time of ED Arrival Up to the Time of Departure from the ED   COMPREHENSIVE METABOLIC PANEL - Abnormal; Notable for the following components:       Result Value    Glucose 132 (*)     GFR Estimate 47 (*)     GFR Estimate If Black 55 (*)     Albumin 3.2 (*)     All other components within normal limits   ROUTINE UA WITH MICROSCOPIC - Abnormal; Notable for the following components:    Mucous Urine Present (*)     All other components within normal limits    CBC WITH PLATELETS DIFFERENTIAL   SARS-COV-2 (COVID-19) VIRUS RT-PCR   TROPONIN I   TSH WITH FREE T4 REFLEX      Emergency Department Course:    Reviewed:  I reviewed nursing notes, vitals, past medical history and care everywhere    Assessments:  I obtained history and examined the patient as noted above.   I rechecked the patient and explained findings.   Patient failed Road test.    Consults:   Ramin Barakat    Interventions:  Medications   amLODIPine (NORVASC) tablet 10 mg (has no administration in time range)   glipiZIDE (GLUCOTROL XL) 24 hr tablet 5 mg (has no administration in time range)   metFORMIN (GLUCOPHAGE-XR) 24 hr tablet 500 mg (has no administration in time range)   metoprolol succinate ER (TOPROL-XL) 24 hr tablet 100 mg (has no administration in time range)   acetaminophen (TYLENOL) tablet 650 mg (has no administration in time range)   acetaminophen (TYLENOL) Suppository 650 mg (has no administration in time range)   melatonin tablet 1 mg (has no administration in time range)   ondansetron (ZOFRAN-ODT) ODT tab 4 mg (has no administration in time range)     Or   ondansetron (ZOFRAN) injection 4 mg (has no administration in time range)        Disposition:  The patient was admitted to the hospital under the care of Dr. Barakat.       Impression & Plan     Medical Decision Makin-year-old [place]ian speaking female presents from group home for weakness and lightheadedness.  Broad differential considered.  Patient well-appearing with normal vitals.  EKG shows sinus rhythm with first-degree AV block but no bradycardia.  Question slight elevation in inferior leads however no chest pain or shortness of breath, troponin negative, and no dynamic changes on repeat EKG.  Here she states she feels quite well other than just feeling tired.  Neurologic exam is normal and nonfocal other than left lower extremity weakness which is baseline and I doubt acute CVA.  CT scan of head is negative.  She is afebrile with  normal white blood cell count and no infectious symptoms.  Her urine is clear.  Her thyroid studies are normal and her glucose, electrolytes, kidney function, LFTs are unremarkable.  Possible that this could be deconditioning though this does appear to be more of a dramatic change in the last 1 to 2 days. Unclear etiology of the patient's symptoms today but I think she will benefit from observation and further evaluation.  Discussed with hospitalist who agrees to accept the patient.  Covid-19  Deborah Zhou was evaluated during a global COVID-19 pandemic, which necessitated consideration that the patient might be at risk for infection with the SARS-CoV-2 virus that causes COVID-19.   Applicable protocols for evaluation were followed during the patient's care.   COVID-19 was considered as part of the patient's evaluation. The plan for testing is:  a test was obtained during this visit.    Diagnosis:    ICD-10-CM    1. Light headedness  R42 Asymptomatic SARS-CoV-2 COVID-19 Virus (Coronavirus) by PCR   2. Generalized muscle weakness  M62.81     Resolved       Discharge Medications:  Current Discharge Medication List          Scribe Disclosure:  ILois, am serving as a scribe at 10:51 AM on 6/16/2021 to document services personally performed by Jalil Young PA-C based on my observations and the provider's statements to me.     Paddy HUTCHISON, kirk serving as a scribe at 5:24 PM on 6/16/2021 to document services personally performed by Jalil Young PA-C based on my observations and the provider's statements to me.        Jalil Young PA-C  06/16/21 [id number]

## 2021-06-16 NOTE — ED TRIAGE NOTES
Pt is VERY [place], normally  Up and around per self, today did not want to get out of bed. C/o dizziness and weakness. Pt speaks cantonese.

## 2021-06-16 NOTE — ED NOTES
I called pt's daughter. Pt's daughter stated that one year ago, pt lived at home alone, but was not eating due to dementia. Daughter then moved pt to a nursing home. Daughter states pt uses a walker independently around the facility. Pt lives at [facility] in [place], phone number is [phone].

## 2021-06-16 NOTE — ED NOTES
Bed: ED28  Expected date: 6/16/21  Expected time: 10:43 AM  Means of arrival: Ambulance  Comments:  Hen [address]- 89y, F, weak/dizzy

## 2021-06-16 NOTE — ED NOTES
[facility]  ED Nurse Handoff Report    Deborah Zhou is a 89 year old female   ED Chief complaint: Generalized Weakness  . ED Diagnosis:   Final diagnoses:   Light headedness   Generalized muscle weakness - Resolved     Allergies:   Allergies   Allergen Reactions     Gabapentin        Code Status: Full Code  Activity level - Baseline/Home:  Assist X 1. Activity Level - Current:   Assist X 1. Lift room needed: No. Bariatric: No   Needed: Yes   Isolation: No. Infection: Not Applicable.     Vital Signs:   Vitals:    06/16/21 1400 06/16/21 1500 06/16/21 1530 06/16/21 1535   BP: 129/64 (!) 141/79 135/69    Pulse: 65 66 68 72   Resp:       Temp:       TempSrc:       SpO2: 93% 93%  99%       Cardiac Rhythm:  ,      Pain level:    Patient confused: Yes. Patient Falls Risk: Yes.   Elimination Status: Has voided   Patient Report - Initial Complaint: weakness. Focused Assessment: Pt is VERY [place], normally  Up and around per self, today did not want to get out of bed. C/o dizziness and weakness. Pt speaks cantonese.   Tests Performed: labs. Abnormal Results:   Labs Ordered and Resulted from Time of ED Arrival Up to the Time of Departure from the ED   COMPREHENSIVE METABOLIC PANEL - Abnormal; Notable for the following components:       Result Value    Glucose 132 (*)     GFR Estimate 47 (*)     GFR Estimate If Black 55 (*)     Albumin 3.2 (*)     All other components within normal limits   ROUTINE UA WITH MICROSCOPIC - Abnormal; Notable for the following components:    Mucous Urine Present (*)     All other components within normal limits   CBC WITH PLATELETS DIFFERENTIAL   SARS-COV-2 (COVID-19) VIRUS RT-PCR   TROPONIN I   TSH WITH FREE T4 REFLEX     Head CT w/o contrast   Preliminary Result   IMPRESSION:    1. No acute findings.   2. Moderate-to-severe small vessel disease.         .   Treatments provided: N/A  Family Comments: N/A  OBS brochure/video discussed/provided to patient:  Yes  ED Medications:  Medications - No data to display  Drips infusing:  No  For the majority of the shift, the patient's behavior Green. Interventions performed were N/A.    Sepsis treatment initiated: No     Patient tested for COVID 19 prior to admission: YES, negative     ED Nurse Name/Phone Number: Miguel A Wilson RN,   3:43 PM    RECEIVING UNIT ED HANDOFF REVIEW    Above ED Nurse Handoff Report was reviewed: Yes  Reviewed by: Karyna Ghosh RN on June 16, 2021 at 5:13 PM

## 2021-06-17 VITALS
OXYGEN SATURATION: 97 % | BODY MASS INDEX: 20.32 KG/M2 | DIASTOLIC BLOOD PRESSURE: 68 MMHG | RESPIRATION RATE: 17 BRPM | SYSTOLIC BLOOD PRESSURE: 134 MMHG | HEART RATE: 82 BPM | WEIGHT: 119 LBS | HEIGHT: 64 IN | TEMPERATURE: 98.4 F

## 2021-06-17 LAB — INTERPRETATION ECG - MUSE: NORMAL

## 2021-06-17 PROCEDURE — 99217 PR OBSERVATION CARE DISCHARGE: CPT | Performed by: PHYSICIAN ASSISTANT

## 2021-06-17 PROCEDURE — 250N000013 HC RX MED GY IP 250 OP 250 PS 637: Performed by: INTERNAL MEDICINE

## 2021-06-17 PROCEDURE — G0378 HOSPITAL OBSERVATION PER HR: HCPCS

## 2021-06-17 RX ORDER — GLIPIZIDE AND METFORMIN HCL 5; 500 MG/1; MG/1
1 TABLET, FILM COATED ORAL
COMMUNITY
End: 2024-03-29

## 2021-06-17 RX ORDER — TORSEMIDE 10 MG/1
10 TABLET ORAL DAILY
Status: ON HOLD | COMMUNITY
End: 2024-04-01

## 2021-06-17 RX ADMIN — AMLODIPINE BESYLATE 10 MG: 10 TABLET ORAL at 11:10

## 2021-06-17 RX ADMIN — METOPROLOL SUCCINATE 100 MG: 100 TABLET, EXTENDED RELEASE ORAL at 11:10

## 2021-06-17 ASSESSMENT — ACTIVITIES OF DAILY LIVING (ADL): DEPENDENT_IADLS:: CLEANING;COOKING;LAUNDRY;SHOPPING;MEDICATION MANAGEMENT;MEAL PREPARATION;TRANSPORTATION

## 2021-06-17 NOTE — CONSULTS
Care Management Initial Consult    General Information  Assessment completed with: Care Team Member, Nurse at [facility]  Type of CM/SW Visit: Initial Assessment    Primary Care Provider verified and updated as needed: No   Readmission within the last 30 days: no previous admission in last 30 days      Reason for Consult: care coordination/care conference, discharge planning           Communication Assessment  Patient's communication style: spoken language (non-English)    Hearing Difficulty or Deaf: yes        Cognitive  Cognitive/Neuro/Behavioral: .WDL except, orientation  Level of Consciousness: alert  Arousal Level: opens eyes spontaneously  Orientation: disoriented to, place, time  Mood/Behavior: calm  Best Language: 0 - No aphasia  Speech: clear, spontaneous    Living Environment:    Current living Arrangements: group home      Able to return to prior arrangements: yes       Family/Social Support:  Care provided by: other (see comments)(Group Home Staff)  Provides care for: no one, unable/limited ability to care for self     Current Resources:   Patient receiving home care services: No     Community Resources: None  Equipment currently used at home: walker, standard    Lifestyle & Psychosocial Needs:        Socioeconomic History     Marital status: Single     Spouse name: Not on file     Number of children: Not on file     Years of education: Not on file     Highest education level: Not on file     Tobacco Use     Smoking status: Never Smoker     Smokeless tobacco: Never Used       Functional Status:  Prior to admission patient needed assistance:   Dependent ADLs:: Ambulation-walker, Bathing, Dressing, Grooming, Transfers, Toileting  Dependent IADLs:: Cleaning, Cooking, Laundry, Shopping, Medication Management, Meal Preparation, Transportation       Care Management Follow Up    Length of Stay (days): 0    Expected Discharge Date: 06/17/21     Concerns to be Addressed: all concerns addressed in this encounter      Patient plan of care discussed at interdisciplinary rounds: Yes    Anticipated Discharge Disposition: Group Home([facility])     Anticipated Discharge Services: None  Anticipated Discharge DME: None    Patient/family educated on Medicare website which has current facility and service quality ratings:  No  Education Provided on the Discharge Plan:    Patient/Family in Agreement with the Plan:      Referrals Placed by CM/SW: External Care Coordination  Private pay costs discussed: Not applicable    Additional Information:  Spoke with nursing at [facility], [phone]. Patient receives assistance with all cares including medication management. Patient is assist of one with mobility and transfers. Patient uses a walker for mobility.   No concerns from [facility] with patient returning and able to return at anytime.  New medications to be filled through [facility].  Discharge orders to be faxed to [phone].     Spoke with daughter over the phone to update on discharge today. Dinorah in agreement and requests medical update from provider.    [facility] Transport scheduled for today at 1300.     Update 1025: Discharge orders faxed to [facility].     Zeina Ramires, RN      Zeina Ramires RN Case Manager  Inpatient Care Coordination  [facility]   [phone]

## 2021-06-17 NOTE — PLAN OF CARE
PT: Orders received. Pt is at/near baseline mobility and does not demonstrate any skilled IP PT needs at this time. Will complete orders.

## 2021-06-17 NOTE — PLAN OF CARE
PRIMARY DIAGNOSIS: GENERALIZED WEAKNESS    OUTPATIENT/OBSERVATION GOALS TO BE MET BEFORE DISCHARGE  1. Orthostatic performed: Yes:          Lying Orthostatic BP: 136/61         Sitting Orthostatic BP: 142/68         Standing Orthostatic BP: 140/60     2. Tolerating PO medications: Yes    3. Return to near baseline physical activity: Yes    4. Cleared for discharge by consultants (if involved): No    Discharge Planner Nurse   Safe discharge environment identified: Yes  Barriers to discharge: Yes       Entered by: Casi Turcios 06/17/2021 4:20 AM     Patient resting comfortably throughout the night. Denies nausea, dizziness, or weakness.    Please review provider order for any additional goals.   Nurse to notify provider when observation goals have been met and patient is ready for discharge.

## 2021-06-17 NOTE — DISCHARGE SUMMARY
Discharge Summary  Hospitalist Service    Deborah Zhou MRN# [id number]   YOB: 1924 Age: 89 year old     Date of Admission: 6/16/2021  Date of Discharge: 6/17/2021  Admitting Physician: Luc Barakat MD  Discharge Physician: Emani Gutierrez PA-C  Discharging Service: Hospitalist Service     [facility]  Primary Care Physician Phone Number: [phone]         Discharge Diagnoses/Problem Oriented Hospital Course (Providers):      Deborah Zhou was admitted on 6/16/2021 by Luc Barakat MD and I would refer you to their history and physical.  Briefly, patient presented from her group home with complaints of dizziness (spinning sensation) and generalized weakness that started acutely on the day of admission.  On admission she was afebrile with heart rate of 66, pressure 130/67 and breathing comfortably on room air.  Lab work including CMP, troponin, TSH, glucose and CBC were unremarkable.  Urinalysis did not appear infected and CT head was negative for stroke.  Her dizziness improved with fluids but she still felt too weak to return home.  She was admitted and received fluids all night with improvement of her symptoms and was requesting to go home the following morning.  It is not completely clear to me what caused her symptoms possibly dehydration however her labs do not support this.  Some consideration was given to stroke but given the short duration of the dizziness I do not think an MRI is necessary at this time.    Her daughter was updated prior to discharge and she was discharged on her home medicines without any new prescriptions.         Code Status:      Full Code        Brief Hospital Stay Summary Sent Home With Patient in AVS:          Reason for your hospital stay      Patient was admitted for dizziness and weakness. CT head was negative for   stroke and lab work including urinalysis was unremarkable. She was treated   with IV fluids and feels much better.  "    Discussed with family to encourage liquid intake at home. If dizzy spells   continue to happen will need further work up                          Pending Results:        Unresulted Labs Ordered in the Past 30 Days of this Admission     No orders found for last 31 day(s).            Discharge Instructions and Follow-Up:      Follow-up Appointments     Follow-up and recommended labs and tests       Follow up with primary care provider, [facility], within   7 days for hospital follow- up.  No follow up labs or test are needed.               Discharge Disposition:        Discharged to home         Discharge Medications:        Current Discharge Medication List      CONTINUE these medications which have NOT CHANGED    Details   AMLODIPINE BESYLATE PO Take 10 mg by mouth daily      glipiZIDE-metFORMIN (METAGLIP) 5-500 MG tablet Take 1 tablet by mouth 2 times daily (before meals)      METOPROLOL SUCCINATE ER PO Take 100 mg by mouth daily      torsemide (DEMADEX) 10 MG tablet Take 10 mg by mouth daily      VITAMIN D, CHOLECALCIFEROL, PO Take 2,000 Units by mouth daily      ORDER FOR DME Equipment being ordered: post op shoe  Qty: 1 Device, Refills: 0    Associated Diagnoses: Injury of toe, left, initial encounter; Hematoma of toe, left, initial encounter               Allergies:         Allergies   Allergen Reactions     Gabapentin            Consultations This Hospital Stay:        No consultations were requested during this admission         Condition and Physical on Discharge:        Discharge condition: Stable   Vitals: Blood pressure 124/60, pulse 67, temperature 97.4  F (36.3  C), temperature source Oral, resp. rate 16, height 1.626 m (5' 4\"), weight 54 kg (119 lb), SpO2 97 %, not currently breastfeeding.     Constitutional: Alert      Lungs: CTAB   Cardiovascular: RRR with no murmur heard on ausculation   Abdomen: Bowel sounds are present with no tenderness to palpation   Skin: No rash or open " sores   Other:          Discharge Time:      Less than 30 minutes.        Image Results From This Hospital Stay (For Non-EPIC Providers):        Results for orders placed or performed during the hospital encounter of 06/16/21   Head CT w/o contrast    Narrative    CT SCAN OF THE HEAD WITHOUT CONTRAST   6/16/2021 2:37 PM     HISTORY: Dizziness, non-specific.    TECHNIQUE:  Axial images of the head and coronal reformations without  IV contrast material. Radiation dose for this scan was reduced using  automated exposure control, adjustment of the mA and/or kV according  to patient size, or iterative reconstruction technique.    COMPARISON: 3/20/2020    FINDINGS:     Intracranial contents: There is mild-to-moderate cerebral volume loss.  Patchy and confluent low-attenuation in the white matter is  nonspecific, though likely due to moderate-to-severe small-vessel  ischemic disease. There is no evidence of intracranial hemorrhage,  mass, acute infarct or anomaly.    Visualized orbits/sinuses/mastoids:  The visualized portions of the  sinuses and mastoids appear normal.    Osseous structures/soft tissues:  There is no evidence of trauma.      Impression    IMPRESSION:   1. No acute findings.  2. Moderate-to-severe small vessel disease.     FLORY BROOKS MD           Most Recent Lab Results In EPIC (For Non-EPIC Providers):    Most Recent 3 CBC's:  Recent Labs   Lab Test 06/16/21  1150 12/15/16  0700   WBC 5.7 6.4   HGB 13.9 12.3   MCV 86 86    272      Most Recent 3 BMP's:  Recent Labs   Lab Test 06/16/21  1150 12/15/16  0700    139   POTASSIUM 3.8 5.1   CHLORIDE 103 104   CO2 27 27   BUN 24 22   CR 1.00 1.03   ANIONGAP 6 8   RADHA 9.3 9.6   * 124*     Most Recent 3 Troponin's:No lab results found.  Most Recent 3 INR's:No lab results found.  Most Recent 2 LFT's:  Recent Labs   Lab Test 06/16/21  1150   AST 16   ALT 22   ALKPHOS 62   BILITOTAL 0.5     Most Recent Cholesterol Panel:No lab results found.  Most  Recent 6 Bacteria Isolates From Any Culture (See EPIC Reports for Culture Details):No lab results found.  Most Recent TSH, T4 and HgbA1c:   Recent Labs   Lab Test 06/16/21  1150   TSH 1.16

## 2021-06-17 NOTE — PLAN OF CARE
Walked patient up in hallways with walker. Patient did not need assistance at all. No SOB or dizziness stated.

## 2021-06-17 NOTE — PLAN OF CARE
"PRIMARY DIAGNOSIS: GENERALIZED WEAKNESS/ DIZZINESS     OUTPATIENT/OBSERVATION GOALS TO BE MET BEFORE DISCHARGE  1. Orthostatic performed: Yes:          Lying Orthostatic BP: 136/61         Sitting Orthostatic BP: 142/68         Standing Orthostatic BP: 140/60     2. Tolerating PO medications:  hasn't given yet    3. Return to near baseline physical activity:  assist of x1    4. Cleared for discharge by consultants (if involved): No    Discharge Planner Nurse   Safe discharge environment identified: Yes  Barriers to discharge: Yes       Entered by: MATT RAY 06/17/2021   Vital signs:  Temp: 97.4  F (36.3  C) Temp src: Oral BP: 124/60 Pulse: 67   Resp: 16 SpO2: 97 % O2 Device: None (Room air) Alert and oriented to self and place. A little disoriented to situation and time. [place] speaking. Very [place]. Tried to use I-pad  this morning. Pt confused and had difficulty to hear/follow  in the I-pad. Pt kept saying \"when am I going to home?\" PT/OT/SW consulted. Pt are assist of x1 with gelt belt and walker. Regular diet. Saline locked. Will continue to monitor.      Please review provider order for any additional goals.   Nurse to notify provider when observation goals have been met and patient is ready for discharge.  "

## 2021-06-17 NOTE — PLAN OF CARE
Patient's After Visit Summary was reviewed with patient- no. Pt confused   Patient verbalized understanding of After Visit Summary, recommended follow up and was given an opportunity to ask questions.   Discharge medications sent home with patient/family: No   Discharged with other:HE MELLISA WINTER.

## 2021-06-17 NOTE — PLAN OF CARE
PRIMARY DIAGNOSIS: GENERALIZED WEAKNESS    OUTPATIENT/OBSERVATION GOALS TO BE MET BEFORE DISCHARGE  1. Orthostatic performed: Yes:          Lying Orthostatic BP: 136/61         Sitting Orthostatic BP: 142/68         Standing Orthostatic BP: 140/60     2. Tolerating PO medications: Yes    3. Return to near baseline physical activity: Yes    4. Cleared for discharge by consultants (if involved): No    Discharge Planner Nurse   Safe discharge environment identified: Yes  Barriers to discharge: Yes       Entered by: Casi Turcios 06/17/2021 12:35 AM     Patient alert to self and situation. Denies pain, dizziness and weakness. Sleeping soundly.    Please review provider order for any additional goals.   Nurse to notify provider when observation goals have been met and patient is ready for discharge.

## 2021-06-17 NOTE — PLAN OF CARE
PRIMARY DIAGNOSIS: GENERALIZED WEAKNESS    OUTPATIENT/OBSERVATION GOALS TO BE MET BEFORE DISCHARGE  1. Orthostatic performed: Yes:          Lying Orthostatic BP: 136/61         Sitting Orthostatic BP: 142/68         Standing Orthostatic BP: 140/60     2. Tolerating PO medications: Yes    3. Return to near baseline physical activity: Yes    4. Cleared for discharge by consultants (if involved): No    Discharge Planner Nurse   Safe discharge environment identified: Yes  Barriers to discharge: Yes       Entered by: Casi Turcios 06/17/2021 12:28 AM     Patient alert to self and situation. Denies pain, dizziness or weakness. Speaks Tajik but unable to communicate with  due to confusion. Extremely Craig, pocket talker utilized. Orthostatic BP completed.     Please review provider order for any additional goals.   Nurse to notify provider when observation goals have been met and patient is ready for discharge.

## 2021-06-17 NOTE — PHARMACY-ADMISSION MEDICATION HISTORY
"Admission medication history interview status for this patient is complete. See [facility] admission navigator for allergy information, prior to admission medications and immunization status.     Medication history interview done, indicate source(s): Med list fropm \"[facility]\"  Medication history resources (including written lists, pill bottles, clinic record):Epic list, fill hsitory.  Pharmacy: [facility]    Changes made to PTA medication list:  Added: Torsemide, Glipizide/Metformin  Deleted: Tylenol, oxycodone, senna-s, naproxen, glipizde, metformin, Miralax  Changed: none    Actions taken by pharmacist (provider contacted, etc):None     Additional medication history information:None    Medication reconciliation/reorder completed by provider prior to medication history?  Y   (Y/N)       Prior to Admission medications    Medication Sig Last Dose Taking? Auth Provider   AMLODIPINE BESYLATE PO Take 10 mg by mouth daily  Yes Reported, Patient   glipiZIDE-metFORMIN (METAGLIP) 5-500 MG tablet Take 1 tablet by mouth 2 times daily (before meals)  Yes Unknown, Entered By History   METOPROLOL SUCCINATE ER PO Take 100 mg by mouth daily  Yes Reported, Patient   torsemide (DEMADEX) 10 MG tablet Take 10 mg by mouth daily  Yes Unknown, Entered By History   VITAMIN D, CHOLECALCIFEROL, PO Take 2,000 Units by mouth daily  Yes Reported, Patient   ORDER FOR DME Equipment being ordered: post op Reina Burden PA-C         "

## 2021-07-13 ENCOUNTER — APPOINTMENT (OUTPATIENT)
Dept: CT IMAGING | Facility: CLINIC | Age: 86
End: 2021-07-13
Attending: EMERGENCY MEDICINE
Payer: COMMERCIAL

## 2021-07-13 ENCOUNTER — HOSPITAL ENCOUNTER (EMERGENCY)
Facility: CLINIC | Age: 86
Discharge: HOME | End: 2021-07-13
Attending: EMERGENCY MEDICINE | Admitting: EMERGENCY MEDICINE
Payer: COMMERCIAL

## 2021-07-13 VITALS
HEART RATE: 71 BPM | DIASTOLIC BLOOD PRESSURE: 62 MMHG | OXYGEN SATURATION: 94 % | RESPIRATION RATE: 18 BRPM | SYSTOLIC BLOOD PRESSURE: 113 MMHG | TEMPERATURE: 97.8 F
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
## 2021-07-13 DIAGNOSIS — M54.50 ACUTE MIDLINE LOW BACK PAIN WITHOUT SCIATICA: ICD-10-CM

## 2021-07-13 PROCEDURE — 70450 CT HEAD/BRAIN W/O DYE: CPT

## 2021-07-13 PROCEDURE — 72192 CT PELVIS W/O DYE: CPT

## 2021-07-13 PROCEDURE — 250N000013 HC RX MED GY IP 250 OP 250 PS 637: Performed by: EMERGENCY MEDICINE

## 2021-07-13 PROCEDURE — 72131 CT LUMBAR SPINE W/O DYE: CPT

## 2021-07-13 PROCEDURE — 99285 EMERGENCY DEPT VISIT HI MDM: CPT | Mod: 25

## 2021-07-13 RX ADMIN — OXYCODONE HYDROCHLORIDE 2.5 MG: 5 TABLET ORAL at 17:00

## 2021-07-13 ASSESSMENT — ENCOUNTER SYMPTOMS
BACK PAIN: 1
NECK PAIN: 0
DYSURIA: 0

## 2021-07-13 NOTE — ED NOTES
Pt oxygen saturations noted to drop to 89%. When entering room, pt is sleeping. 2L supplemental oxygen started via NC.

## 2021-07-13 NOTE — ED NOTES
I spoke with Pt's daughter and she reported that patient was in her bathroom last night and got water on the floor. She slipped on the water and landed on her bottom side but did not hit her head. Pt has pain when she tries to sit.

## 2021-07-13 NOTE — ED TRIAGE NOTES
Pt arrives via EMS after fall last night. Per EMS, pt fell in her bathroom at an [facility] around 2000. Pt had no complaints last night but today complains of buttock or coccyx pain. Pt was given tylenol before leaving care facility.

## 2021-07-13 NOTE — ED PROVIDER NOTES
History   Chief Complaint:  Fall       HPI   Deborah Zhou is a 89 year old female with history of hypertension, hyperlipidemia, and type II diabetes who presents with a fall. Per EMS, the patient fell in her bathroom at her assisted living facility around 2000 last night. She had no complaints last night but today she complains of lower left back pain. She was given Tylenol before leaving her care facility. She denies any neck pain, chest pain, arm pain, or dysuria.    Review of Systems   Cardiovascular: Negative for chest pain.   Genitourinary: Negative for dysuria.   Musculoskeletal: Positive for back pain (lower left). Negative for neck pain.        Negative for arm pain.   All other systems reviewed and are negative.    Allergies:  Gabapentin    Medications:  Amlodipine  Metaglip  Metoprolol  Demadex  Cholecalciferol    Past Medical History:    Hypertension  Wound infection  Lower extremity edema  Type II diabetes  CKD   PVD  Pseudophakia  Hyperlipidemia  Cataracts    Past Surgical History:    Cataract removal    Family History:    The patient denies past family history.     Social History:  Patient was brought by EMS.  Patient came from care facility.    Physical Exam     Patient Vitals for the past 24 hrs:   BP Temp Temp src Pulse Resp SpO2   07/13/21 1830 136/63 -- -- 70 -- 93 %   07/13/21 1800 (!) 107/95 -- -- 71 -- 94 %   07/13/21 1743 -- -- -- -- -- 95 %   07/13/21 1739 -- -- -- -- -- (!) 89 %   07/13/21 1730 107/53 -- -- 70 -- --   07/13/21 1715 114/55 -- -- 71 -- 94 %   07/13/21 1645 108/56 -- -- 72 -- --   07/13/21 1630 114/54 -- -- 68 -- --   07/13/21 1615 118/58 -- -- 75 -- 93 %   07/13/21 1610 120/68 97.8  F (36.6  C) Oral 76 18 94 %   07/13/21 1600 120/68 -- -- 74 -- 93 %       Physical Exam  General: The patient appears uncomfortable, elderly. Turkmen speaking.  Extremely hard of hearing.   Head:  The scalp, face, and head appear normal  Eyes:  The pupils are equal, round, and reactive to  light    There is no nystagmus    Extraocular muscles are intact    Conjunctivae and sclerae are normal  ENT:    The nose is normal    Pinnae are normal    The oropharynx is normal  Neck:  Normal range of motion    There is no midline cervical spine pain/tenderness  CV:  Regular rate and underlying rhythm     Normal S1 and S2    No S3 or S4    No pathological murmur detected  Resp:  Lungs are clear    There is no tachypnea    Non-labored breathing    No rales    No wheezing   GI:  Abdomen is soft, there is no rigidity    No distension    No tympani    No rebound tenderness     Non-surgical without peritoneal features  MS:  Back:    The cervical and thoracic spine is non-tender in the midline    The lumbar spine is mildly tender in the midline, worse in midline on sacrum/tailbone.     There is mild bilateral lumbar paraspinous muscular pain to palpation    Legs:    There is normal motor strength of bilateral lower extremities    There is no sensory abnormality/paresthesia    There is no numbness    There is no radiculopathy    There is normal capillary refill to the toes  Skin:  No rash or acute skin lesions noted.  No shingles noted.  Neuro: Speech is normal and fluent.  Antalgic gait.    Psych:  Awake. Alert.  Normal affect.  Appropriate interactions.    Emergency Department Course     Imaging:    CT Pelvis Bone wo Contrast  1.  No acute fracture or dislocation.  Reading per radiology    Lumbar spine CT w/o contrast  1.  No acute fracture.  2.  Moderate chronic L4 compression.  Reading per radiology    CT Head w/o Contrast  1.  Stable exam. No acute intracranial abnormality.  2.  Stable age-related changes.  Reading per radiology    Emergency Department Course:    Reviewed:  I reviewed nursing notes, vitals, past medical history and care everywhere    Assessments:  1632 I obtained history and examined the patient as noted above.   1830 I rechecked the patient and explained findings. She is ambulating well with a  walker     Consults:   1749 I consulted with the patients family and discussed patient findings and plan of care.  1831 I consulted with the family again and updated them on patient findings and discussed discharge.    Interventions:  1700 Roxicodone    Disposition:  The patient was discharged to home.       Impression & Plan     Medical Decision Making:  Deborah Zhou is a 89 year old female who presents for evaluation of a fall.  This was clearly a mechanical fall, not syncope.  I spoke at length with the daughter, Dinorah, over the phone who states that she likely slipped on some water on the bathroom ground and fell injuring her tailbone or buttock.  There were no prodromal symptoms so I doubt stroke, cardiac arrhythmia or other serious etiology.  Detailed exam shows pain to the midline tailbone and L-spine.  Based on presentation, I did not do basic blood work and urinalysis.  Results as above.  CT is thankfully negative for acute fracture and/or dislocation.  No signs of sacral or tailbone fracture.  CT scan of the head was obtained due to age and fall and reassuringly negative for acute intracranial pathology.  I had the tech ambulate the patient and she did well with a walker which is what she uses at baseline.  Based on this I would send home for outpatient management.  I would not do workup for syncope, stroke, ACS, PE at this point.  I doubt serious underlying fractures, intracerebral issues, spinal fractures.  Daughter Dinorah picked the patient up from the emergency department plans to return her back to her assisted living facility.  Follow-up with primary care as needed.  Discharged home.    Diagnosis:    ICD-10-CM    1. Fall, initial encounter  W19.XXXA    2. Acute midline low back pain without sciatica  M54.5      Discharge Medications:  New Prescriptions    No medications on file     Scribe Disclosure:  I, Paddy Lewis, am serving as a scribe at 4:14 PM on 7/13/2021 to document services personally performed  by Donovan Brock MD based on my observations and the provider's statements to me.            Donovan Brock MD  07/13/21 7937

## 2023-01-29 ENCOUNTER — HOSPITAL ENCOUNTER (EMERGENCY)
Facility: CLINIC | Age: 88
Discharge: HOME | End: 2023-01-30
Attending: EMERGENCY MEDICINE | Admitting: EMERGENCY MEDICINE
Payer: COMMERCIAL
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
## 2023-01-29 DIAGNOSIS — M25.552 BILATERAL HIP PAIN: ICD-10-CM

## 2023-01-29 DIAGNOSIS — M54.50 ACUTE BILATERAL LOW BACK PAIN WITHOUT SCIATICA: ICD-10-CM

## 2023-01-29 DIAGNOSIS — M25.551 BILATERAL HIP PAIN: ICD-10-CM

## 2023-01-29 PROCEDURE — 99284 EMERGENCY DEPT VISIT MOD MDM: CPT | Mod: 25 | Performed by: EMERGENCY MEDICINE

## 2023-01-29 RX ORDER — ACETAMINOPHEN 500 MG
1000 TABLET ORAL ONCE
Status: COMPLETED | OUTPATIENT
Start: 2023-01-29 | End: 2023-01-30

## 2023-01-29 ASSESSMENT — ENCOUNTER SYMPTOMS
ARTHRALGIAS: 1
BACK PAIN: 1
NECK PAIN: 0

## 2023-01-29 ASSESSMENT — ACTIVITIES OF DAILY LIVING (ADL): ADLS_ACUITY_SCORE: 35

## 2023-01-30 ENCOUNTER — APPOINTMENT (OUTPATIENT)
Dept: CT IMAGING | Facility: CLINIC | Age: 88
End: 2023-01-30
Attending: EMERGENCY MEDICINE
Payer: COMMERCIAL

## 2023-01-30 VITALS
SYSTOLIC BLOOD PRESSURE: 114 MMHG | RESPIRATION RATE: 16 BRPM | DIASTOLIC BLOOD PRESSURE: 63 MMHG | HEART RATE: 71 BPM | OXYGEN SATURATION: 90 % | TEMPERATURE: 98 F

## 2023-01-30 PROCEDURE — 73700 CT LOWER EXTREMITY W/O DYE: CPT | Mod: 50

## 2023-01-30 PROCEDURE — 74176 CT ABD & PELVIS W/O CONTRAST: CPT

## 2023-01-30 PROCEDURE — 250N000013 HC RX MED GY IP 250 OP 250 PS 637: Performed by: EMERGENCY MEDICINE

## 2023-01-30 RX ADMIN — ACETAMINOPHEN 1000 MG: 500 TABLET ORAL at 00:49

## 2023-01-30 ASSESSMENT — ACTIVITIES OF DAILY LIVING (ADL)
ADLS_ACUITY_SCORE: 35
ADLS_ACUITY_SCORE: 35

## 2023-01-30 NOTE — ED PROVIDER NOTES
Sign Out Note    Pt accepted in sign out from: Dr. Robbie Burks pt presented to the ED for: fall out of bed. Back pain.    Plan at time of sign out: await ct result. Patient may go back to facility of ct neg.    Care of patient during my shift: ct neg. Informed patient of this at 0132. RN to arrange for transport back.    CT Abdomen Pelvis w/o Contrast   Final Result   IMPRESSION:    1.  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   2.  Cholelithiasis   3.  Normal appendix         CT Hip Bilateral w/o Contrast   Final Result   IMPRESSION:    1.  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   2.  Cholelithiasis   3.  Normal appendix             Plan for patient at this time: discharged.     Burt Prince,   01/30/23 013

## 2023-01-30 NOTE — ED NOTES
Call placed to the [place] [employer] at [phone] , spoke with the Dispatcher to do a  check at the [facility], considering no return call back from the facility. The Dispatcher to call the facility and have them call this author.

## 2023-01-30 NOTE — DISCHARGE INSTRUCTIONS
Prescription strength dosing instructions:  - 650mg acetaminophen (tylenol) every 4-6 hours or 1000mg every 6-8 hours (maximum of 3000mg in 24 hours).  Acetaminophen is often in combination over the counter and prescription pain medications.  Be sure to include this in daily total.

## 2023-01-30 NOTE — ED NOTES
Call paced to the [facility] using the telephone number ( [phone]) listed on pt's transferred documents where she resides, message left on their voice mail to call this author at [phone]. MD updated.

## 2023-01-30 NOTE — ED NOTES
Received telephone call from the PD who was able to have me speak with Marilyn( Direct Support Staff) at the [facility]. Report given to Marilyn and that pt will be transfer back to [facility]. [facility] provide telephone number [phone] as the number to call.

## 2023-01-30 NOTE — ED NOTES
"Pt asked if in pian, with the Pocket Talker in bilateral ears pt stated \" I am okay. Don't worry. Can't sign nothing don't have money.\" Reassurance provided. MD notified.    "

## 2023-01-30 NOTE — ED NOTES
Per Dr Avalos Picture from [facility] looks like pt.  On arrival pt explained to MD what happened today.  He RN has called family,  and NH.  No answer from NH and family.  Pt refuses to answer .  CT notified that RNs and MD will take responsibility for Identifying the pt in order to get CT

## 2023-01-30 NOTE — ED NOTES
Call placed to [facility]resp[facility] using telephone number retrieved via [employer] searched ([phone]), message left on the voice mail to call this author at [phone]. MD and ED charge nurse updated.

## 2023-01-30 NOTE — ED NOTES
Call placed  to  the [facility] via telephone number [phone] for nurse-to-nurse report, message left on the voice mail to call this author at [phone].

## 2023-01-30 NOTE — ED PROVIDER NOTES
"  History     Chief Complaint:  Back Pain       HPI   Deborah Zhou is a 89 year old female with a history of recurrent falls, left hip fracture, hyperlipidemia, and hypertension who presents with a fall. The patient was brought by EMS from her group home after falling out of bed tonight.  States \"out of bed, whomp\" and motions with her hands to the ground.  Points to the right hip first and rotates to show me across her lower back/posterior pelvis as areas of pain.  Rotates the other way and left hip hurts as well. She denies any head pain or neck pain. No loss of consciousness per EMS and she is not on any blood thinners.  H[place] Konger preference indicated.  H[place] Konger  attempted but more information was communicated in English than with the .    Independent Historian: Patient and EMS     Review of External Notes: Nurse triage visit regarding a fall in June 2022 in [facility]; reviewed PCP note from May 2022 with blood work and ordered walker    ROS:  Review of Systems   HENT:        (-) head pain   Musculoskeletal: Positive for arthralgias (bilateral hip) and back pain (low). Negative for neck pain.   All other systems reviewed and are negative.      Allergies:  Gabapentin     Medications:    Amlodipine  Glipizide-metformin  Demadex  Cholecalciferol    Past Medical History:    Recurrent falls  MCI  Left hip fracture  Type II diabetes  Primary osteoarthritis of both knees  Osteoporosis  Pterygium of both eyes  Pseudophakia of both eyes  Varicose veins of legs  Hyperlipidemia  Hypertension    Past Surgical History:    Extracapsular cataract removal bilateral    Family History:    The patient denies any prior family history.    Social History:  Patient came from her group home by EMS.  Patient is unaccompanied in the ED.  PCP: [facility]     Physical Exam     Patient Vitals for the past 24 hrs:   BP Temp Temp src Pulse SpO2   01/29/23 2225 (!) 156/80 97.2  F (36.2  C) Oral 75 " 94 %        Physical Exam  Eyes:               Sclera white; Pupils are equal and round  ENT:                External ears and nares normal  CV:                  Rate as above with regular rhythm   Resp:               Breath sounds clear and equal bilaterally                          Non-labored, no retractions or accessory muscle use  GI:                   Abdomen is soft, non-tender, non-distended                          No rebound tenderness or peritoneal features  MS:                  Moves all extremities                          Lifts R leg off of bed which causes pain in the right hip and low back.  Lifts left leg off the bed easier but still has pain in the left hip with this as well.                          Points to the lower lumbar area and posterior pelvis as areas of pain.  No step-off           .  Skin:                Warm and dry  Neuro:             Broken English consistent with second language, communicating. No apparent deficit.    Emergency Department Course     ECG  N/A    Imaging:  CT Abdomen Pelvis w/o Contrast    (Results Pending)   CT Hip Bilateral w/o Contrast    (Results Pending)      Report per radiology    Laboratory:  Labs Ordered and Resulted from Time of ED Arrival to Time of ED Departure - No data to display     Procedures   None    Emergency Department Course & Assessments:       Interventions:  Medications   acetaminophen (TYLENOL) tablet 1,000 mg (1,000 mg Oral Given 1/30/23 0049)     Independent Interpretation (X-rays, CTs, rhythm strip):  NA     Consultations/Discussion of Management or Tests:  2239 I obtained history and examined the patient as noted above.  2250 I spoke with the [employer] tech and they recommended with the imaging needed to have a CT done due to combination of films ordered and concern about ability to get a good quality lumbar    Social Determinants of Health affecting care:  Education/Literacy  Difficulty initially confirming her identity with her which delayed  imaging.  However has picture included in her paperwork from facility and past visits consistent with current presentation and history.  Arrived via EMS with paperwork.  All paperwork and chart is consistent with her identity.    Disposition:  Care of the patient was transferred to my colleague Dr. Prince pending CT results.     Impression & Plan      Medical Decision Making:  She arrives after a fall.  She is alert and communicating.  There was no reported head injury and there is no neurologic findings to suggest concussion.  There is fortunately no evidence of thoracic, chest, or extremity injuries.  Pain is localized to the low back and bilateral hips.  Initially ordered x-rays but then was notified by the tech if there is difficulty standing her and hips have to be obtained by rolling side to side that this will limit the quality of the lumbar film.  Recommended CT.  Based on her age pelvic fractures that could be missed on x-ray are also possible.  Initial delays in being able to get imaging tests due to her not having a wristband and then ensuring that her identity was correct.  Voicemail was left with her family member on file.  Nursing did not receive an answer initially at the facility phone number that we had.  I ultimately reviewed care everywhere and the paperwork from her facility which shows a picture as well.   Identity is consistent.  At time of signout she has been to CT scan but results were not back yet.  If negative and she is ambulatory then she is appropriate for discharge back.    Diagnosis:    ICD-10-CM    1. Acute bilateral low back pain without sciatica  M54.50       2. Bilateral hip pain  M25.551     M25.552       3. Fall from bed, initial encounter  W06.XXXA          Scribe Disclosure:  I, Paddy Lewis, am serving as a scribe at 11:15 PM on 1/29/2023 to document services personally performed by Alexa Avalos MD based on my observations and the provider's statements to me.        Alexa Avalos MD  01/30/23 0152

## 2023-01-30 NOTE — ED NOTES
Language:   Services called  requested a [place]  . spoke with [place] Islander   D# [id number].  Using the ED Pocket Talker Pt was asked  to stated her name and date of birthday for her ID wrist band placement. Pt was also  informed that she has CT and XR ordered to check for injuries after falling. Pt informed the  she talks too much and did not respond to the questions asked. Dr. Avalos was notified. Plan is to call  pt daughter Dinorah Monsalve @ [phone].

## 2023-01-30 NOTE — ED TRIAGE NOTES
"Pt BIBA following fall at group home. Per EMS, pt \"slid out of bed\" and is now c/o lower back pain. Per EMS, pt is not on blood thinners and no LOC. No treatments given PTA.      "

## 2023-01-30 NOTE — ED NOTES
Call placed to Dinorah Monsalve( Pt's daughter), message left on her voice mail to call this author at: [phone]. MD updated.

## 2024-03-29 ENCOUNTER — HOSPITAL ENCOUNTER (OUTPATIENT)
Facility: CLINIC | Age: 89
Setting detail: OBSERVATION
Discharge: HOME HEALTH | End: 2024-04-01
Attending: EMERGENCY MEDICINE | Admitting: HOSPITALIST
Payer: COMMERCIAL

## 2024-03-29 ENCOUNTER — APPOINTMENT (OUTPATIENT)
Dept: GENERAL RADIOLOGY | Facility: CLINIC | Age: 89
End: 2024-03-29
Payer: COMMERCIAL

## 2024-03-29 DIAGNOSIS — N18.30 STAGE 3 CHRONIC KIDNEY DISEASE, UNSPECIFIED WHETHER STAGE 3A OR 3B CKD (H): ICD-10-CM

## 2024-03-29 DIAGNOSIS — N17.9 AKI (ACUTE KIDNEY INJURY) (H): ICD-10-CM

## 2024-03-29 DIAGNOSIS — E11.9 TYPE 2 DIABETES, HBA1C GOAL < 8% (H): Primary | ICD-10-CM

## 2024-03-29 DIAGNOSIS — I10 ESSENTIAL HYPERTENSION: ICD-10-CM

## 2024-03-29 DIAGNOSIS — N39.0 UTI (URINARY TRACT INFECTION): ICD-10-CM

## 2024-03-29 DIAGNOSIS — R73.9 HYPERGLYCEMIA: ICD-10-CM

## 2024-03-29 DIAGNOSIS — R79.89 ELEVATED LACTIC ACID LEVEL: ICD-10-CM

## 2024-03-29 LAB
ALBUMIN UR-MCNC: 30 MG/DL
ANION GAP SERPL CALCULATED.3IONS-SCNC: 17 MMOL/L (ref 7–15)
APPEARANCE UR: ABNORMAL
B-OH-BUTYR SERPL-SCNC: <0.18 MMOL/L
BACTERIA #/AREA URNS HPF: ABNORMAL /HPF
BASE EXCESS BLDV CALC-SCNC: 0 MMOL/L (ref -3–3)
BASE EXCESS BLDV CALC-SCNC: 1 MMOL/L (ref -3–3)
BASOPHILS # BLD AUTO: 0 10E3/UL (ref 0–0.2)
BASOPHILS NFR BLD AUTO: 0 %
BILIRUB UR QL STRIP: NEGATIVE
BUN SERPL-MCNC: 40.4 MG/DL (ref 8–23)
CALCIUM SERPL-MCNC: 9.3 MG/DL (ref 8.2–9.6)
CHLORIDE SERPL-SCNC: 98 MMOL/L (ref 98–107)
COLOR UR AUTO: ABNORMAL
CREAT SERPL-MCNC: 1.33 MG/DL (ref 0.51–0.95)
DEPRECATED HCO3 PLAS-SCNC: 20 MMOL/L (ref 22–29)
EGFRCR SERPLBLD CKD-EPI 2021: 36 ML/MIN/1.73M2
EOSINOPHIL # BLD AUTO: 0.1 10E3/UL (ref 0–0.7)
EOSINOPHIL NFR BLD AUTO: 1 %
ERYTHROCYTE [DISTWIDTH] IN BLOOD BY AUTOMATED COUNT: 12 % (ref 10–15)
FLUAV RNA SPEC QL NAA+PROBE: NEGATIVE
FLUBV RNA RESP QL NAA+PROBE: NEGATIVE
GLUCOSE BLDC GLUCOMTR-MCNC: 249 MG/DL (ref 70–99)
GLUCOSE BLDC GLUCOMTR-MCNC: 407 MG/DL (ref 70–99)
GLUCOSE SERPL-MCNC: 405 MG/DL (ref 70–99)
GLUCOSE UR STRIP-MCNC: >=1000 MG/DL
HCO3 BLDV-SCNC: 26 MMOL/L (ref 21–28)
HCO3 BLDV-SCNC: 27 MMOL/L (ref 21–28)
HCT VFR BLD AUTO: 41 % (ref 35–47)
HGB BLD-MCNC: 13.9 G/DL (ref 11.7–15.7)
HGB UR QL STRIP: ABNORMAL
HYALINE CASTS: 1 /LPF
IMM GRANULOCYTES # BLD: 0 10E3/UL
IMM GRANULOCYTES NFR BLD: 0 %
KETONES UR STRIP-MCNC: NEGATIVE MG/DL
LACTATE BLD-SCNC: 2.4 MMOL/L
LACTATE BLD-SCNC: 3 MMOL/L
LEUKOCYTE ESTERASE UR QL STRIP: ABNORMAL
LYMPHOCYTES # BLD AUTO: 1.9 10E3/UL (ref 0.8–5.3)
LYMPHOCYTES NFR BLD AUTO: 30 %
MCH RBC QN AUTO: 28.3 PG (ref 26.5–33)
MCHC RBC AUTO-ENTMCNC: 33.9 G/DL (ref 31.5–36.5)
MCV RBC AUTO: 84 FL (ref 78–100)
MONOCYTES # BLD AUTO: 0.4 10E3/UL (ref 0–1.3)
MONOCYTES NFR BLD AUTO: 7 %
NEUTROPHILS # BLD AUTO: 3.8 10E3/UL (ref 1.6–8.3)
NEUTROPHILS NFR BLD AUTO: 62 %
NITRATE UR QL: NEGATIVE
NRBC # BLD AUTO: 0 10E3/UL
NRBC BLD AUTO-RTO: 0 /100
PCO2 BLDV: 45 MM HG (ref 40–50)
PCO2 BLDV: 47 MM HG (ref 40–50)
PH BLDV: 7.36 [PH] (ref 7.32–7.43)
PH BLDV: 7.37 [PH] (ref 7.32–7.43)
PH UR STRIP: 5.5 [PH] (ref 5–7)
PLATELET # BLD AUTO: 215 10E3/UL (ref 150–450)
PO2 BLDV: 23 MM HG (ref 25–47)
PO2 BLDV: 43 MM HG (ref 25–47)
POTASSIUM SERPL-SCNC: 4.8 MMOL/L (ref 3.4–5.3)
RBC # BLD AUTO: 4.91 10E6/UL (ref 3.8–5.2)
RBC URINE: 4 /HPF
RSV RNA SPEC NAA+PROBE: NEGATIVE
SAO2 % BLDV: 38 % (ref 70–75)
SAO2 % BLDV: 77 % (ref 70–75)
SARS-COV-2 RNA RESP QL NAA+PROBE: NEGATIVE
SODIUM SERPL-SCNC: 135 MMOL/L (ref 135–145)
SP GR UR STRIP: 1.01 (ref 1–1.03)
SQUAMOUS EPITHELIAL: 1 /HPF
UROBILINOGEN UR STRIP-MCNC: NORMAL MG/DL
WBC # BLD AUTO: 6.2 10E3/UL (ref 4–11)
WBC URINE: 39 /HPF

## 2024-03-29 PROCEDURE — 81001 URINALYSIS AUTO W/SCOPE: CPT

## 2024-03-29 PROCEDURE — 258N000003 HC RX IP 258 OP 636

## 2024-03-29 PROCEDURE — 85025 COMPLETE CBC W/AUTO DIFF WBC: CPT

## 2024-03-29 PROCEDURE — 82803 BLOOD GASES ANY COMBINATION: CPT

## 2024-03-29 PROCEDURE — 96375 TX/PRO/DX INJ NEW DRUG ADDON: CPT

## 2024-03-29 PROCEDURE — 250N000011 HC RX IP 250 OP 636

## 2024-03-29 PROCEDURE — 82962 GLUCOSE BLOOD TEST: CPT

## 2024-03-29 PROCEDURE — 87637 SARSCOV2&INF A&B&RSV AMP PRB: CPT

## 2024-03-29 PROCEDURE — G0378 HOSPITAL OBSERVATION PER HR: HCPCS

## 2024-03-29 PROCEDURE — 80048 BASIC METABOLIC PNL TOTAL CA: CPT

## 2024-03-29 PROCEDURE — 87086 URINE CULTURE/COLONY COUNT: CPT

## 2024-03-29 PROCEDURE — 82010 KETONE BODYS QUAN: CPT

## 2024-03-29 PROCEDURE — 99223 1ST HOSP IP/OBS HIGH 75: CPT | Performed by: HOSPITALIST

## 2024-03-29 PROCEDURE — 71046 X-RAY EXAM CHEST 2 VIEWS: CPT

## 2024-03-29 PROCEDURE — 96365 THER/PROPH/DIAG IV INF INIT: CPT

## 2024-03-29 PROCEDURE — 36415 COLL VENOUS BLD VENIPUNCTURE: CPT

## 2024-03-29 PROCEDURE — 83036 HEMOGLOBIN GLYCOSYLATED A1C: CPT | Performed by: HOSPITALIST

## 2024-03-29 PROCEDURE — 96361 HYDRATE IV INFUSION ADD-ON: CPT

## 2024-03-29 PROCEDURE — 99285 EMERGENCY DEPT VISIT HI MDM: CPT | Mod: 25

## 2024-03-29 RX ORDER — AMLODIPINE BESYLATE 5 MG/1
5 TABLET ORAL DAILY
COMMUNITY

## 2024-03-29 RX ORDER — CHOLECALCIFEROL (VITAMIN D3) 50 MCG
1 TABLET ORAL DAILY
COMMUNITY

## 2024-03-29 RX ORDER — ONDANSETRON 2 MG/ML
4 INJECTION INTRAMUSCULAR; INTRAVENOUS ONCE
Status: COMPLETED | OUTPATIENT
Start: 2024-03-29 | End: 2024-03-29

## 2024-03-29 RX ORDER — METOPROLOL SUCCINATE 100 MG/1
100 TABLET, EXTENDED RELEASE ORAL DAILY
Status: ON HOLD | COMMUNITY
End: 2024-04-01

## 2024-03-29 RX ORDER — GLIPIZIDE 5 MG/1
5 TABLET ORAL DAILY
Status: ON HOLD | COMMUNITY
End: 2024-04-01

## 2024-03-29 RX ORDER — ASPIRIN 81 MG/1
81 TABLET ORAL DAILY
COMMUNITY

## 2024-03-29 RX ORDER — ACETAMINOPHEN 500 MG
1000 TABLET ORAL EVERY 6 HOURS PRN
COMMUNITY

## 2024-03-29 RX ORDER — GLIPIZIDE 5 MG/1
2.5 TABLET ORAL DAILY
Status: ON HOLD | COMMUNITY
End: 2024-04-01

## 2024-03-29 RX ORDER — CEFTRIAXONE 1 G/1
1 INJECTION, POWDER, FOR SOLUTION INTRAMUSCULAR; INTRAVENOUS ONCE
Status: COMPLETED | OUTPATIENT
Start: 2024-03-29 | End: 2024-03-29

## 2024-03-29 RX ADMIN — SODIUM CHLORIDE 1000 ML: 9 INJECTION, SOLUTION INTRAVENOUS at 20:18

## 2024-03-29 RX ADMIN — CEFTRIAXONE SODIUM 1 G: 1 INJECTION, POWDER, FOR SOLUTION INTRAMUSCULAR; INTRAVENOUS at 22:09

## 2024-03-29 RX ADMIN — ONDANSETRON 4 MG: 2 INJECTION INTRAMUSCULAR; INTRAVENOUS at 21:09

## 2024-03-29 ASSESSMENT — ACTIVITIES OF DAILY LIVING (ADL)
ADLS_ACUITY_SCORE: 38

## 2024-03-29 ASSESSMENT — COLUMBIA-SUICIDE SEVERITY RATING SCALE - C-SSRS
6. HAVE YOU EVER DONE ANYTHING, STARTED TO DO ANYTHING, OR PREPARED TO DO ANYTHING TO END YOUR LIFE?: NO
2. HAVE YOU ACTUALLY HAD ANY THOUGHTS OF KILLING YOURSELF IN THE PAST MONTH?: NO
1. IN THE PAST MONTH, HAVE YOU WISHED YOU WERE DEAD OR WISHED YOU COULD GO TO SLEEP AND NOT WAKE UP?: NO

## 2024-03-29 NOTE — ED TRIAGE NOTES
BIBA from home. Lab work earlier today showed elevated BG. For EMS it was 447. No symptoms  aside from maybe some blurred vision. Patient speaks Portuguese and is very [place]     Triage Assessment (Adult)       Row Name 03/29/24 0804          Triage Assessment    Airway WDL WDL        Respiratory WDL    Respiratory WDL WDL        Skin Circulation/Temperature WDL    Skin Circulation/Temperature WDL WDL        Cardiac WDL    Cardiac WDL WDL        Peripheral/Neurovascular WDL    Peripheral Neurovascular WDL WDL        Cognitive/Neuro/Behavioral WDL    Cognitive/Neuro/Behavioral WDL WDL

## 2024-03-29 NOTE — ED PROVIDER NOTES
"  History     Chief Complaint:  Hyperglycemia       HPI   Deborah Zhou is a 89 year old female with a history of type II diabetes, CKD, and HTN who presents for evaluation of hyperglycemia. The patient was at her PCP's office today for evaluation of diabetes, and right 5th toe skin discoloration. Her PCP listed that this is likely gangrene and did not see any signs of infection, recommended follow up with podiatry for further assessment. Had routine lab draw and patient was noticed to have elevated glucose in the 400's was called and recommended to present to the ED for further evaluation. Currently, the patient is not complaining of any pain. History is difficult to obtain as the patient's primary language is Malay and is hard of hearing making it difficult to hear the .     Once family arrived we discussed her symptoms/hyperglycemia. They state that the patient has not complained of any new symptoms recently. Her granddaughter notes that she was fatigued today, but otherwise has been at her baseline. She lives in assisted living.     Independent Historian:   None - Patient Only     Review of External Notes:   3/29/24: Patient evaluated by PCP for right small toe discoloration and routine labs for diabetes. Noted to be hyperglycemic with glucose in the 400's referred to ED for further evaluation. Plan was to send patient to podiatry for eval of the great toe color changes.     Medications:    AMLODIPINE BESYLATE PO  glipiZIDE-metFORMIN (METAGLIP) 5-500 MG tablet  METOPROLOL SUCCINATE ER PO  ORDER FOR DME  torsemide (DEMADEX) 10 MG tablet  VITAMIN D, CHOLECALCIFEROL, PO      Past Medical History:    Diabetes  CKD  HTN    Past Surgical History:    No pertinent past surgical history.      Physical Exam   Patient Vitals for the past 24 hrs:   BP Temp Temp src Pulse Resp SpO2 Height Weight   03/29/24 1710 (!) 152/75 98.5  F (36.9  C) Temporal 78 18 97 % 1.549 m (5' 1\") 51.7 kg (114 lb)        Physical " Exam  General: Alert, well developed, well nourished. Cooperative.     In minimal distress, hard of hearing.   HEENT:  Head:  Atraumatic  Ears:  External ears are normal  Mouth/Throat:  Oropharynx is without erythema or exudate and mucous membranes are moist.   Eyes:   Conjunctivae normal and EOM are normal. No scleral icterus.    Pupils are equal, round, and reactive to light.   Neck:   Normal range of motion. Neck supple.  CV:  Normal rate, regular rhythm, normal heart sounds and radial pulses are 2+ and symmetric.  No murmur.  Resp:  Breath sounds are clear bilaterally    Non-labored, no retractions or accessory muscle use  GI:  Abdomen is soft, no distension, no tenderness. No rebound or guarding.   MS:  Normal range of motion. No edema.    Back atraumatic.  Skin:  Right little toe shows very dark red discoloration over the distal dorsal surface consistent with a small hematoma or wound.  There is no surrounding erythema, fluctuance, induration, or purulence.  Warm and dry.  No rash noted.  Neuro:   Alert. Normal strength.  Unable to obtain thorough neurologic exam secondary to hearing loss and inability to understand commands (Sinhala primary language). Cranial nerves II- VII grossly intact.   Psych: Normal mood and affect.    Emergency Department Course   Imaging:  XR Chest 2 Views   Final Result   IMPRESSION: Low lung volumes. Heart size and pulmonary vascularity normal. Subsegmental atelectasis in the bases. No acute infiltrates or effusions. Extensive atherosclerotic disease thoracic aorta. Scoliosis. Degenerative changes thoracic spine.         Laboratory:  Labs Ordered and Resulted from Time of ED Arrival to Time of ED Departure   GLUCOSE BY METER - Abnormal       Result Value    GLUCOSE BY METER POCT 407 (*)    BASIC METABOLIC PANEL - Abnormal    Sodium 135      Potassium 4.8      Chloride 98      Carbon Dioxide (CO2) 20 (*)     Anion Gap 17 (*)     Urea Nitrogen 40.4 (*)     Creatinine 1.33 (*)      GFR Estimate 36 (*)     Calcium 9.3      Glucose 405 (*)    ROUTINE UA WITH MICROSCOPIC REFLEX TO CULTURE - Abnormal    Color Urine Straw      Appearance Urine Slightly Cloudy (*)     Glucose Urine >=1000 (*)     Bilirubin Urine Negative      Ketones Urine Negative      Specific Gravity Urine 1.010      Blood Urine Trace (*)     pH Urine 5.5      Protein Albumin Urine 30 (*)     Urobilinogen Urine Normal      Nitrite Urine Negative      Leukocyte Esterase Urine Moderate (*)     Bacteria Urine Few (*)     RBC Urine 4 (*)     WBC Urine 39 (*)     Squamous Epithelials Urine 1      Hyaline Casts Urine 1     ISTAT GASES LACTATE VENOUS POCT - Abnormal    Lactic Acid POCT 3.0 (*)     Bicarbonate Venous POCT 27      O2 Sat, Venous POCT 38 (*)     pCO2 Venous POCT 47      pH Venous POCT 7.36      pO2 Venous POCT 23 (*)     Base Excess/Deficit (+/-) POCT 1.0     ISTAT GASES LACTATE VENOUS POCT - Abnormal    Lactic Acid POCT 2.4 (*)     Bicarbonate Venous POCT 26      O2 Sat, Venous POCT 77 (*)     pCO2 Venous POCT 45      pH Venous POCT 7.37      pO2 Venous POCT 43      Base Excess/Deficit (+/-) POCT 0.0     GLUCOSE BY METER - Abnormal    GLUCOSE BY METER POCT 249 (*)    KETONE BETA-HYDROXYBUTYRATE QUANTITATIVE, RAPID - Normal    Ketone (Beta-Hydroxybutyrate) Quantitative <0.18     INFLUENZA A/B, RSV, & SARS-COV2 PCR - Normal    Influenza A PCR Negative      Influenza B PCR Negative      RSV PCR Negative      SARS CoV2 PCR Negative     CBC WITH PLATELETS AND DIFFERENTIAL    WBC Count 6.2      RBC Count 4.91      Hemoglobin 13.9      Hematocrit 41.0      MCV 84      MCH 28.3      MCHC 33.9      RDW 12.0      Platelet Count 215      % Neutrophils 62      % Lymphocytes 30      % Monocytes 7      % Eosinophils 1      % Basophils 0      % Immature Granulocytes 0      NRBCs per 100 WBC 0      Absolute Neutrophils 3.8      Absolute Lymphocytes 1.9      Absolute Monocytes 0.4      Absolute Eosinophils 0.1      Absolute Basophils 0.0       Absolute Immature Granulocytes 0.0      Absolute NRBCs 0.0     LACTIC ACID WHOLE BLOOD   URINE CULTURE        Procedures   None    Emergency Department Course & Assessments:    Interventions:  Medications   cefTRIAXone (ROCEPHIN) 1 g vial to attach to  mL bag for ADULTS or NS 50 mL bag for PEDS (has no administration in time range)   sodium chloride 0.9% BOLUS 1,000 mL (1,000 mLs Intravenous $New Bag 3/29/24 2018)   ondansetron (ZOFRAN) injection 4 mg (4 mg Intravenous $Given 3/29/24 2109)        Assessments:  1706: Initial evaluation and assessment.    Independent Interpretation (X-rays, CTs, rhythm strip):  None    Consultations/Discussion of Management or Tests:  Patient was seen in conjunction with Dr. Tavarez.    2145: Discussed the case with Dr. Benites, hospitalist who agreed to accept the patient for observation.    Social Determinants of Health affecting care:   Patient's primary language is Lithuanian, hard of hearing.     Disposition:  The patient was admitted to the hospital under the care of Dr. Benites.     Impression & Plan    CMS Diagnoses: The Lactic acid level is elevated due to dehydration secondary to hyperglycemia, at this time there is no sign of severe sepsis or septic shock. and None    MIPS (If applicable):  N/A    Medical Decision Making:  Deborah Zhou is a 89 year old female with a history of type II diabetes, CKD, and HTN who presents for evaluation of hyperglycemia.  The patient was evaluated by her primary care provider today for chronic management of diabetes and discoloration of the right small toe.  They obtained blood work following the visit which returned and noted the patient to be hyperglycemic with glucose in the 400s.  They called the patient and recommended she present to the emergency department for further evaluation.  On exam, the patient is moderately difficult to assess secondary to primary language is Hebrew and being hard of hearing, therefore she was  unable to understand the  over the phone.  However, the patient has regular rate and rhythm with clear breath sounds bilaterally and no obvious focal neurologic deficits.  The wound over the small toe does not show any concerning signs of infection.  Vital signs show mildly elevated blood pressure without fever, tachycardia, or hypoxia.  COVID, influenza, RSV testing are negative.  Blood work shows hyperglycemia of 407 with an elevated anion gap and elevated creatinine.  There is no leukocytosis, anemia, or other concerning electrolyte abnormalities.  Ketones are negative.  UA shows evidence of infection, urine culture is pending.  Chest x-ray shows no evidence of infiltrate.  The patient was given a liter of fluids we rechecked lactate and glucose which were both still elevated at 2.4 and 249 respectively.  Given the patient's age, communication barrier, living situation, hyperglycemia with UTI, RAHUL, and elevated lactic we recommended she be admitted for ongoing treatment and observation.  We discussed this with Dr. Benites, hospitalist, who agreed to accept the patient for observation.  Ceftriaxone was ordered for treatment of UTI.  We reviewed these results with the patient and her family regarding need for observation and they are in agreement with this plan.       Diagnosis:    ICD-10-CM    1. Hyperglycemia  R73.9       2. UTI (urinary tract infection)  N39.0       3. Elevated lactic acid level  R79.89            Aline Barahona PA-C  3/29/2024        Aline Barahona PA-C  03/29/24 2205

## 2024-03-30 LAB
ANION GAP SERPL CALCULATED.3IONS-SCNC: 13 MMOL/L (ref 7–15)
BUN SERPL-MCNC: 30.3 MG/DL (ref 8–23)
CALCIUM SERPL-MCNC: 8.7 MG/DL (ref 8.2–9.6)
CHLORIDE SERPL-SCNC: 107 MMOL/L (ref 98–107)
CREAT SERPL-MCNC: 1.19 MG/DL (ref 0.51–0.95)
DEPRECATED HCO3 PLAS-SCNC: 20 MMOL/L (ref 22–29)
EGFRCR SERPLBLD CKD-EPI 2021: 41 ML/MIN/1.73M2
GLUCOSE BLDC GLUCOMTR-MCNC: 107 MG/DL (ref 70–99)
GLUCOSE BLDC GLUCOMTR-MCNC: 139 MG/DL (ref 70–99)
GLUCOSE BLDC GLUCOMTR-MCNC: 178 MG/DL (ref 70–99)
GLUCOSE BLDC GLUCOMTR-MCNC: 184 MG/DL (ref 70–99)
GLUCOSE BLDC GLUCOMTR-MCNC: 258 MG/DL (ref 70–99)
GLUCOSE BLDC GLUCOMTR-MCNC: 291 MG/DL (ref 70–99)
GLUCOSE BLDC GLUCOMTR-MCNC: 302 MG/DL (ref 70–99)
GLUCOSE SERPL-MCNC: 183 MG/DL (ref 70–99)
HBA1C MFR BLD: 12.2 %
LACTATE SERPL-SCNC: 2 MMOL/L (ref 0.7–2)
POTASSIUM SERPL-SCNC: 4.7 MMOL/L (ref 3.4–5.3)
SODIUM SERPL-SCNC: 140 MMOL/L (ref 135–145)

## 2024-03-30 PROCEDURE — 82962 GLUCOSE BLOOD TEST: CPT

## 2024-03-30 PROCEDURE — 250N000012 HC RX MED GY IP 250 OP 636 PS 637: Performed by: INTERNAL MEDICINE

## 2024-03-30 PROCEDURE — G0378 HOSPITAL OBSERVATION PER HR: HCPCS

## 2024-03-30 PROCEDURE — 80048 BASIC METABOLIC PNL TOTAL CA: CPT | Performed by: HOSPITALIST

## 2024-03-30 PROCEDURE — 96361 HYDRATE IV INFUSION ADD-ON: CPT

## 2024-03-30 PROCEDURE — 250N000012 HC RX MED GY IP 250 OP 636 PS 637: Performed by: HOSPITALIST

## 2024-03-30 PROCEDURE — 96366 THER/PROPH/DIAG IV INF ADDON: CPT

## 2024-03-30 PROCEDURE — 36415 COLL VENOUS BLD VENIPUNCTURE: CPT | Performed by: HOSPITALIST

## 2024-03-30 PROCEDURE — 83605 ASSAY OF LACTIC ACID: CPT

## 2024-03-30 PROCEDURE — 99232 SBSQ HOSP IP/OBS MODERATE 35: CPT | Performed by: INTERNAL MEDICINE

## 2024-03-30 PROCEDURE — 250N000011 HC RX IP 250 OP 636: Performed by: HOSPITALIST

## 2024-03-30 PROCEDURE — 36415 COLL VENOUS BLD VENIPUNCTURE: CPT

## 2024-03-30 PROCEDURE — 258N000003 HC RX IP 258 OP 636: Performed by: INTERNAL MEDICINE

## 2024-03-30 PROCEDURE — 258N000003 HC RX IP 258 OP 636: Performed by: HOSPITALIST

## 2024-03-30 RX ORDER — ACETAMINOPHEN 650 MG/1
650 SUPPOSITORY RECTAL EVERY 4 HOURS PRN
Status: DISCONTINUED | OUTPATIENT
Start: 2024-03-30 | End: 2024-04-01 | Stop reason: HOSPADM

## 2024-03-30 RX ORDER — AMOXICILLIN 250 MG
1 CAPSULE ORAL 2 TIMES DAILY PRN
Status: DISCONTINUED | OUTPATIENT
Start: 2024-03-30 | End: 2024-04-01 | Stop reason: HOSPADM

## 2024-03-30 RX ORDER — AMOXICILLIN 250 MG
2 CAPSULE ORAL 2 TIMES DAILY PRN
Status: DISCONTINUED | OUTPATIENT
Start: 2024-03-30 | End: 2024-04-01 | Stop reason: HOSPADM

## 2024-03-30 RX ORDER — ONDANSETRON 4 MG/1
4 TABLET, ORALLY DISINTEGRATING ORAL EVERY 6 HOURS PRN
Status: DISCONTINUED | OUTPATIENT
Start: 2024-03-30 | End: 2024-04-01 | Stop reason: HOSPADM

## 2024-03-30 RX ORDER — SODIUM CHLORIDE 9 MG/ML
INJECTION, SOLUTION INTRAVENOUS CONTINUOUS
Status: ACTIVE | OUTPATIENT
Start: 2024-03-30 | End: 2024-03-30

## 2024-03-30 RX ORDER — CEFTRIAXONE 1 G/1
1 INJECTION, POWDER, FOR SOLUTION INTRAMUSCULAR; INTRAVENOUS EVERY 24 HOURS
Status: DISCONTINUED | OUTPATIENT
Start: 2024-03-30 | End: 2024-03-31

## 2024-03-30 RX ORDER — DEXTROSE MONOHYDRATE 25 G/50ML
25-50 INJECTION, SOLUTION INTRAVENOUS
Status: DISCONTINUED | OUTPATIENT
Start: 2024-03-30 | End: 2024-04-01 | Stop reason: HOSPADM

## 2024-03-30 RX ORDER — SODIUM CHLORIDE 9 MG/ML
INJECTION, SOLUTION INTRAVENOUS CONTINUOUS
Status: DISCONTINUED | OUTPATIENT
Start: 2024-03-30 | End: 2024-03-30

## 2024-03-30 RX ORDER — ACETAMINOPHEN 325 MG/1
650 TABLET ORAL EVERY 4 HOURS PRN
Status: DISCONTINUED | OUTPATIENT
Start: 2024-03-30 | End: 2024-04-01 | Stop reason: HOSPADM

## 2024-03-30 RX ORDER — NICOTINE POLACRILEX 4 MG
15-30 LOZENGE BUCCAL
Status: DISCONTINUED | OUTPATIENT
Start: 2024-03-30 | End: 2024-03-30

## 2024-03-30 RX ORDER — NICOTINE POLACRILEX 4 MG
15-30 LOZENGE BUCCAL
Status: DISCONTINUED | OUTPATIENT
Start: 2024-03-30 | End: 2024-04-01 | Stop reason: HOSPADM

## 2024-03-30 RX ORDER — ONDANSETRON 2 MG/ML
4 INJECTION INTRAMUSCULAR; INTRAVENOUS EVERY 6 HOURS PRN
Status: DISCONTINUED | OUTPATIENT
Start: 2024-03-30 | End: 2024-04-01 | Stop reason: HOSPADM

## 2024-03-30 RX ORDER — DEXTROSE MONOHYDRATE 25 G/50ML
25-50 INJECTION, SOLUTION INTRAVENOUS
Status: DISCONTINUED | OUTPATIENT
Start: 2024-03-30 | End: 2024-03-30

## 2024-03-30 RX ADMIN — SODIUM CHLORIDE: 9 INJECTION, SOLUTION INTRAVENOUS at 00:59

## 2024-03-30 RX ADMIN — SODIUM CHLORIDE: 9 INJECTION, SOLUTION INTRAVENOUS at 17:26

## 2024-03-30 RX ADMIN — CEFTRIAXONE SODIUM 1 G: 1 INJECTION, POWDER, FOR SOLUTION INTRAMUSCULAR; INTRAVENOUS at 17:26

## 2024-03-30 RX ADMIN — SODIUM CHLORIDE: 9 INJECTION, SOLUTION INTRAVENOUS at 13:22

## 2024-03-30 RX ADMIN — INSULIN ASPART 1 UNITS: 100 INJECTION, SOLUTION INTRAVENOUS; SUBCUTANEOUS at 01:30

## 2024-03-30 RX ADMIN — INSULIN GLARGINE 10 UNITS: 100 INJECTION, SOLUTION SUBCUTANEOUS at 15:01

## 2024-03-30 ASSESSMENT — ACTIVITIES OF DAILY LIVING (ADL)
ADLS_ACUITY_SCORE: 41
ADLS_ACUITY_SCORE: 41
ADLS_ACUITY_SCORE: 43
ADLS_ACUITY_SCORE: 41
ADLS_ACUITY_SCORE: 41
ADLS_ACUITY_SCORE: 42
ADLS_ACUITY_SCORE: 43
ADLS_ACUITY_SCORE: 41
ADLS_ACUITY_SCORE: 43
ADLS_ACUITY_SCORE: 43
ADLS_ACUITY_SCORE: 41
ADLS_ACUITY_SCORE: 43
ADLS_ACUITY_SCORE: 43
ADLS_ACUITY_SCORE: 42
DEPENDENT_IADLS:: CLEANING;COOKING;LAUNDRY;SHOPPING;MEDICATION MANAGEMENT;MONEY MANAGEMENT;TRANSPORTATION
ADLS_ACUITY_SCORE: 42
ADLS_ACUITY_SCORE: 41
ADLS_ACUITY_SCORE: 42
ADLS_ACUITY_SCORE: 41
ADLS_ACUITY_SCORE: 38
ADLS_ACUITY_SCORE: 43
ADLS_ACUITY_SCORE: 41
ADLS_ACUITY_SCORE: 41
ADLS_ACUITY_SCORE: 43

## 2024-03-30 NOTE — PROGRESS NOTES
Catia, the Care Coordinator at [facility] called today. RN informed this CC about possible need for Insulin dose for this pt at discharge and whether their facility could accommodate this. Catia informed this RN that they would have to check with their director first and let us know. Her contact is [phone]. Thank you!

## 2024-03-30 NOTE — PROGRESS NOTES
Observation goals  PRIOR TO DISCHARGE       Comments:   -diagnostic tests and consults completed and resulted- Not met   -vital signs normal or at patient baseline- Met

## 2024-03-30 NOTE — CONSULTS
Care Management Initial Consult    General Information  Assessment completed with: Care Team Member, Children,    Type of CM/SW Visit: Initial Assessment    Primary Care Provider verified and updated as needed:     Readmission within the last 30 days:        Reason for Consult: discharge planning  Advance Care Planning: Advance Care Planning Reviewed: other (see comments) (Daughter will bring in if she can find it.)          Communication Assessment  Patient's communication style: spoken language (non-English)    Hearing Difficulty or Deaf: yes        Cognitive  Cognitive/Neuro/Behavioral: .WDL except  Level of Consciousness: alert  Arousal Level: opens eyes spontaneously  Orientation: disoriented to, place, situation  Mood/Behavior: calm  Best Language: 0 - No aphasia  Speech: clear    Living Environment:   People in home: facility resident     Current living Arrangements: assisted living  [facility]: [facility]   Able to return to prior arrangements: yes       Family/Social Support:  Care provided by: self, other (see comments) (facilty)  Provides care for: no one, unable/limited ability to care for self  Marital Status: Single  Children          Description of Support System: Supportive, Involved         Current Resources:   Patient receiving home care services: No     Community Resources: County Worker, Transportation Services  Equipment currently used at home:    Supplies currently used at home: Incontinence Supplies, Diabetic Supplies    Employment/Financial:  Employment Status: retired        Financial Concerns: none   Referral to Financial Worker: No       Does the patient's insurance plan have a 3 day qualifying hospital stay waiver?  No    Lifestyle & Psychosocial Needs:  Social Determinants of Health     Food Insecurity: Not on file   Depression: Not on file   Housing Stability: Not on file   Tobacco Use: Not on file   Financial Resource Strain: Not on file   Alcohol Use: Not on file    Transportation Needs: Not on file   Physical Activity: Not on file   Interpersonal Safety: Not on file   Stress: Not on file   Social Connections: Not on file       Functional Status:  Prior to admission patient needed assistance:   Dependent ADLs:: Ambulation-walker, Bathing  Dependent IADLs:: Cleaning, Cooking, Laundry, Shopping, Medication Management, Money Management, Transportation     Values/Beliefs:  Spiritual, Cultural Beliefs, Sabianism Practices, Values that affect care: yes  Description of Beliefs that Will Affect Care: Charleen            Additional Information:  Writer spoke with Catia ([phone]) care coordinator for patient's [facility].  Per Catia, patient receives assistance with med management, bathing, all meals and occassional assistance with dressing and toileting when needed.  Patient uses a walker at baseline for mobility and although she does wear something for incontinence, she does not have many accidents.     Per Catia, staff also checks patient's blood sugar and can assist with insulin injections if patient discharges with new insulin.  Director of [facility], Cezar will have to be notified regarding new orders for insulin as she will need to be JESSY when patient returns to review meds and administer insulin.  Cezar's number is:  [phone].  Main office number for [facility] is:  [phone] and fax is; [phone].     Writer also spoke with daughter Dinorah who confirmed the above.  Dinorah would be one transporting patient home at discharge.    Per Catia, [facility] can also support patient if she requires assist of 2.  Awaiting PT to see patient and make recommendations.    Juanita Gallo RN Care Coordinator  [facility]  [phone]

## 2024-03-30 NOTE — ED NOTES
ED ATTENDING PHYSICIAN NOTE:   I evaluated this patient in conjunction with Giovana story PA-C  I have participated in the care of the patient and personally performed key elements of the history, exam, and medical decision making.      HPI:   Deborah Zhou is a 89 year old female who presents from the clinic with elevated glucose and black right little toe    Independent Historian:   Family and urgent care note    Review of External Notes: As above     EXAM:   Constitutional: Elderly  female supine with no respiratory distress  HENT: No signs of trauma.   Eyes: EOM are normal. Pupils are equal, round, and reactive to light.   Neck: Normal range of motion. No JVD present. No cervical adenopathy.  Cardiovascular: Regular rhythm.  Exam reveals no gallop and no friction rub.    No murmur heard.  Pulmonary/Chest: Bilateral breath sounds normal. No wheezes, rhonchi or rales.  Abdominal: Soft. No tenderness. No rebound or guarding.   Musculoskeletal: Black discoloration of right little toe under and proximal to the nail this is hard and firm but does not give the appearance of gangrene.  This is only on the dorsal aspect  Lymphadenopathy: No lymphadenopathy.   Neurological: Alert  Normal strength. Coordination normal.   Skin: Skin is warm and dry. No rash noted. No erythema.      Independent Interpretation (X-rays, CTs, rhythm strip):  None    Consultations/Discussion of Management or Tests:  Hospitalist    Social Determinants of Health affecting care:   None     MEDICAL DECISION MAKING/ASSESSMENT AND PLAN:   Patient presents to the ED with elevated glucose elevated lactate level and mild renal insufficiency.  She was treated with fluids and antibiotics and will be brought into the hospital for observation her toe does not appear to be gangrenous.  She can be followed up with podiatry for this.     DIAGNOSIS:     ICD-10-CM    1. Hyperglycemia  R73.9       2. UTI (urinary tract infection)  N39.0       3. Elevated  lactic acid level  R79.89       4. RAHUL (acute kidney injury) (H24)  N17.9                DISPOSITION:   Admit     Roberto Tavarez MD  3/29/2024  [facility] EMERGENCY DEPT      Roberto Tavarez MD  03/29/24 5139

## 2024-03-30 NOTE — PLAN OF CARE
Goal Outcome Evaluation:  PRIMARY Concern: Hyperglycemia   SAFETY RISK Concerns (fall risk, behaviors, etc.): Fall risk   Isolation/Type: None   Tests/Procedures for NEXT shift: None   Consults? (Pending/following, signed-off?) None   Where is patient from? (Home, TCU, etc.): Assisted living facility   Other Important info for NEXT shift: Patient speaks Syrian and is very [place]   Anticipated DC date & active delays: TBD     SUMMARY NOTE:     Orientation/Cognitive: A&O to self and time. [place]  Observation Goals (Met/ Not Met): Not met   Mobility Level/Assist Equipment: Ax2 GB/Walker   Antibiotics & Plan (IV/po, length of tx left): IV Rocephin   Pain Management: Denies   Tele/VS/O2: VSS on RA   ABNL Lab/BG: LO=702  Diet: Regular   Bowel/Bladder: Incontinent, Purewick in place   Skin Concerns: Right distal fifth toe scab  Drains/Devices: IVF NS @75ml/hr   Patient Stated Goal for Today: Rest         Observation goals  PRIOR TO DISCHARGE       Comments:   -diagnostic tests and consults completed and resulted- Not met   -vital signs normal or at patient baseline- Met

## 2024-03-30 NOTE — PROGRESS NOTES
diagnostic tests and consults completed and resulted No, still tweaking insulin dosing, UC and PT eval pending  -vital signs normal or at patient baseline  Yes

## 2024-03-30 NOTE — PROGRESS NOTES
[facility]    Hospitalist Progress Note    Assessment & Plan   Deborah Zhou is a 89 year old female with a past medical history of type 2 diabetes and hypertension presents to hospital with hyperglycemia and a urinary tract infection.        Hyperglycemia  Type 2 diabetes  Patient presents to hospital with hyperglycemia seen on outpatient labs with blood sugar greater than 400.  A1c is 11.8% on day of presentation.  Her hyperglycemia is likely due to her urinary tract infection.  Blood sugar improved in the emergency room with 1 L of normal saline.  Feels well. Taking po   Started on maint fluids  BS down to 184. Now up to 301    Plan;    insulin sliding scale-- increase to moderate ISS  Hypoglycemia protocol  Stop ivfs as good po intake  Hold glipizide- probably replace with insulin   Add prandial aspart 1 unit/10 gram carb--> increase to 1 unit per 8 gram carb  Dm diet  -start lantus 10 units now   -AL can do insulin      Urinary tract infection  Lactic acidosis  The patient's UA is concerning for an infection.  Additionally she does have a lactic acidosis.  Unable to determine if the patient is experiencing any symptoms due to communication barriers.  I do not think the patient is experiencing sepsis but rather is likely dehydrated from hyperglycemia which was due to her UTI.  Nontoxic appearing  Ucx pending  Lactate 2  Plan;   Cont Ceftriaxone  Follow-up urine culture  Stop fluids     Hypertension  Patient is on PTA amlodipine, metoprolol, torsemide  Normotensive toda   Plan;   -hold her torsemide in setting of lactic acidosis.  - hold PTA antihypertensives for now as normotensive- ToprolXl, norvasc 5mg qd     CKD stage III  Creatinine is stable and compared to labs from 2023.  Monitor  Avoid nephrotoxic medications     Right distal fifth toe scab versus gangrene  Her PCP is aware of the finding and discussed options regarding treatment including surgical options.  The family and her PCP  decided together that nonoperative management would be pursued.  -no pain. No signs infection. Follow for now, plan as above         DVT ppx: SCDs  Expected length of stay less than 2 days  Code Status: DNR/DNI    Dispo-  PT consult  Discharge 1-2 days once BS under control     moderate complexity    Matt Holguin MD, MD  Text Page  (7am to 6pm)  Interval History   Seen with  but still issues with interpretation/communications  Despite this pt able to communicate no pain R foot, no sob  She is feeling well  No complaints   Taking po well     -Data reviewed today: I reviewed all new labs and imaging results over the last 24 hours. I personally reviewed labs and imaging since admission   Physical Exam   Temp: 97.8  F (36.6  C) Temp src: Oral BP: 117/58 Pulse: 76   Resp: 18 SpO2: 96 % O2 Device: None (Room air)    Vitals:    03/29/24 1710 03/30/24 0110   Weight: 51.7 kg (114 lb) 53.5 kg (117 lb 15.1 oz)     Vital Signs with Ranges  Temp:  [97.8  F (36.6  C)-98.6  F (37  C)] 97.8  F (36.6  C)  Pulse:  [65-78] 76  Resp:  [18] 18  BP: (117-152)/(58-81) 117/58  SpO2:  [93 %-97 %] 96 %  No intake/output data recorded.    Constitutional: Up in chair, nad,  Respiratory: Slightly bibasilar crackles. Breathing easily  Cardiovascular: rRR no r/g/m  GI: soft, nt nd  Skin/Integumen: no rash or edema  Neuro: nl speech, grossly nonfocal       Medications      cefTRIAXone  1 g Intravenous Q24H    insulin aspart  1-3 Units Subcutaneous TID AC    insulin aspart  1-3 Units Subcutaneous At Bedtime    insulin aspart   Subcutaneous TID w/meals       Data   Recent Labs   Lab 03/30/24  1227 03/30/24  1120 03/30/24  0809 03/30/24  0657 03/29/24  2116 03/29/24  1740   WBC  --   --   --   --   --  6.2   HGB  --   --   --   --   --  13.9   MCV  --   --   --   --   --  84   PLT  --   --   --   --   --  215   NA  --   --   --  140  --  135   POTASSIUM  --   --   --  4.7  --  4.8   CHLORIDE  --   --   --  107  --  98   CO2  --   --    --  20*  --  20*   BUN  --   --   --  30.3*  --  40.4*   CR  --   --   --  1.19*  --  1.33*   ANIONGAP  --   --   --  13  --  17*   RADHA  --   --   --  8.7  --  9.3   * 291* 184* 183*   < > 405*    < > = values in this interval not displayed.       Imaging:   Recent Results (from the past 24 hour(s))   XR Chest 2 Views    Narrative    EXAM: XR CHEST 2 VIEWS  LOCATION: [facility]  DATE: 3/29/2024    INDICATION: Hyperglycemia, concern for pneumonia  COMPARISON: None.      Impression    IMPRESSION: Low lung volumes. Heart size and pulmonary vascularity normal. Subsegmental atelectasis in the bases. No acute infiltrates or effusions. Extensive atherosclerotic disease thoracic aorta. Scoliosis. Degenerative changes thoracic spine.

## 2024-03-30 NOTE — PROGRESS NOTES
Admission/Transfer from: Admission   2 RN skin assessment completed. Yes   Name of 2nd RN: Davina BOYD   Significant findings include: Right distal fifth toe scab   [facility] Nurse Consult Ordered? No

## 2024-03-30 NOTE — PHARMACY-ADMISSION MEDICATION HISTORY
Pharmacist Admission Medication History    Admission medication history is complete. The information provided in this note is only as accurate as the sources available at the time of the update.    Information Source(s): Facility (U/NH/) medication list/MAR and CareEverywhere/SureScripts via N/A    Pertinent Information: Aspirin RXd today    Changes made to PTA medication list:  Added: glipizide, asa, APAP  Deleted: glipizide/metformin  Changed: amlodipine 10 mg > 5 mg    Allergies reviewed with patient and updates made in EHR: unable to assess    Medication History Completed By: Mary Goldberg [facility] 3/29/2024 10:00 PM    PTA Med List   Medication Sig Last Dose    acetaminophen (TYLENOL) 500 MG tablet Take 1,000 mg by mouth every 6 hours as needed for mild pain     amLODIPine (NORVASC) 5 MG tablet Take 5 mg by mouth daily     aspirin 81 MG EC tablet Take 81 mg by mouth daily  at NOT YET STARTED    glipiZIDE (GLUCOTROL) 5 MG tablet Take 5 mg by mouth daily     glipiZIDE (GLUCOTROL) 5 MG tablet Take 2.5 mg by mouth daily In the afternoon     metoprolol succinate ER (TOPROL XL) 100 MG 24 hr tablet Take 100 mg by mouth daily     torsemide (DEMADEX) 10 MG tablet Take 10 mg by mouth daily     vitamin D3 (CHOLECALCIFEROL) 50 mcg (2000 units) tablet Take 1 tablet by mouth daily

## 2024-03-30 NOTE — H&P
[facility]    History and Physical  Hospitalist     Date of Admission:  3/29/2024    Primary Care Physician   [facility]    Chief Complaint   Hyperglycemia    History obtained from the patient's daughter    History of Present Illness   Deborah Zhou is a 89 year old female with a past medical history of diabetes and hypertension presents to the hospital due to hyperglycemia.  The patient lives at an assisted living facility and was noted to have a black fifth digit on her right toe on Thursday, March 28.  She went to see her PCP the following day and gangrene was suspected of the digit.  Given her age risks and benefits were discussed with the family and nonoperative management with observation was recommended and agreed upon.  Routine blood work was drawn and the patient went home unfortunately the blood work revealed hyperglycemia.  Patient was notified that her blood sugars were in the 400s and brought to the hospital.  The patient herself is hard of hearing and reviewed to me speaking making communication very difficult with a telephone .  Most the history was obtained from her daughter.       Past Medical History    I have reviewed this patient's medical history and updated it with pertinent information if needed.   No past medical history on file.    Past Surgical History   I have reviewed this patient's surgical history and updated it with pertinent information if needed.  No past surgical history on file.    Allergies   Allergies   Allergen Reactions    Gabapentin        Social History   I have reviewed this patient's social history and updated it with pertinent information if needed. Deborah Zhou  reports that she has never smoked. She has never used smokeless tobacco.    Family History   I have reviewed this patient's family history and updated it with pertinent information if needed.   No family history on file.    Physical Exam   Temp: 98.5  F (36.9  C)  Temp src: Temporal BP: (!) 152/75 Pulse: 78   Resp: 18 SpO2: 97 % O2 Device: None (Room air)    Vital Signs with Ranges  Temp:  [98.5  F (36.9  C)] 98.5  F (36.9  C)  Pulse:  [78] 78  Resp:  [18] 18  BP: (152)/(75) 152/75  SpO2:  [97 %] 97 %  114 lbs 0 oz  Physical Exam  Vitals reviewed.   Constitutional:       Comments: Pleasant elderly lady appears stated age seen sitting up in bed in no apparent distress watching TV.   HENT:      Head: Normocephalic and atraumatic.   Cardiovascular:      Rate and Rhythm: Normal rate and regular rhythm.   Pulmonary:      Effort: Pulmonary effort is normal.      Breath sounds: Normal breath sounds.   Abdominal:      General: Abdomen is flat. Bowel sounds are normal.      Palpations: Abdomen is soft.   Musculoskeletal:         General: No swelling.   Skin:     General: Skin is warm and dry.      Comments: The tip of the fifth digit on the right foot is black possibly representing a scab versus dry gangrene.   Neurological:      General: No focal deficit present.      Mental Status: She is alert.         Assessment & Plan   Deborah Zhou is a 89 year old female with a past medical history of type 2 diabetes and hypertension presents to hospital with hyperglycemia and a urinary tract infection.      Hyperglycemia  Type 2 diabetes  Patient presents to hospital with hyperglycemia seen on outpatient labs with blood sugar greater than 400.  A1c is 11.8% on day of presentation.  Her hyperglycemia is likely due to her urinary tract infection.  Blood sugar improved in the emergency room with 1 L of normal saline.  Start insulin sliding scale  Hypoglycemia protocol  IV hydration  Hold glipizide    Urinary tract infection  Lactic acidosis  The patient's UA is concerning for an infection.  Additionally she does have a lactic acidosis.  Unable to determine if the patient is experiencing any symptoms due to communication barriers.  I do not think the patient is experiencing sepsis but rather is  likely dehydrated from hyperglycemia which was due to her UTI.  Ceftriaxone  Follow-up urine culture  IV hydration    Hypertension  Patient is on PTA amlodipine, metoprolol, torsemide  Will hold her torsemide in setting of lactic acidosis.  Resume PTA antihypertensives as needed.    CKD stage III  Creatinine is stable and compared to labs from 2023.  Monitor  Avoid nephrotoxic medications    Right distal fifth toe scab versus gangrene  Her PCP is aware of the finding and discussed options regarding treatment including surgical options.  The family and her PCP decided together that nonoperative management would be pursued.  Monitor    DVT ppx: SCDs  Expected length of stay less than 2 days  Code Status: DNR/DNI    Medical Decision Making       75 MINUTES SPENT BY ME on the date of service doing chart review, history, exam, documentation & further activities per the note.

## 2024-03-30 NOTE — PROGRESS NOTES
diagnostic tests and consults completed and resulted No, still tweaking insulin dosing, UC pending  -vital signs normal or at patient baseline  Yes

## 2024-03-30 NOTE — ED NOTES
"[facility]  ED Nurse Handoff Report    ED Chief complaint: Hyperglycemia      ED Diagnosis:   Final diagnoses:   Hyperglycemia   UTI (urinary tract infection)   Elevated lactic acid level   RAHUL (acute kidney injury) (H24)       Code Status: Full Code    Allergies:   Allergies   Allergen Reactions    Gabapentin        Patient Story: Patient BIBA from home with hyperglycemia.  She was at PCP today being evaluated for toe discoloration on right foot and found to have BS in 400's.  Hx. of type 2 diabetes. Patient is primarily Salvadorean speaking and very hard of hearing.  Assessment and story primarily provided by family.  Family reports patient has been fatigued lately, but otherwise denies any symptoms.  Focused Assessment: Initial labs: , lactic 3.5, creat 1.3, anion gap 17, and UA shows UTI.   BS and lactic improved after IV fluids.    Treatments and/or interventions provided: IVF, IV antibiotics, IV zofran  Patient's response to treatments and/or interventions: Tolerated well. BS and lactic improved.    To be done/followed up on inpatient unit:  IV antibiotics, symptom management.    Does this patient have any cognitive concerns?:  none    Activity level - Baseline/Home:  Independent  Activity Level - Current:   Stand with Assist    Patient's Preferred language: Burkinan   Needed?: Yes    Isolation: None  Infection: Not Applicable  Patient tested for COVID 19 prior to admission: NO  Bariatric?: No    Vital Signs:   Vitals:    03/29/24 1710 03/29/24 2200   BP: (!) 152/75 129/81   Pulse: 78 72   Resp: 18    Temp: 98.5  F (36.9  C)    TempSrc: Temporal    SpO2: 97% 93%   Weight: 51.7 kg (114 lb)    Height: 1.549 m (5' 1\")        Cardiac Rhythm:     Was the PSS-3 completed:   Yes  What interventions are required if any?               Family Comments: Family went home for the night but is available by telephone if needed.  OBS brochure/video discussed/provided to patient/family: No    "           Name of person given brochure if not patient: na              Relationship to patient: na    For the majority of the shift this patient's behavior was Green.   Behavioral interventions performed were none.    ED NURSE PHONE NUMBER: [phone]

## 2024-03-30 NOTE — ED NOTES
Patient ambulated with a walker about 20 feet. Needed a wheelchair to make it the rest of the way to the bathroom. Patient settled in bed.

## 2024-03-30 NOTE — PROGRESS NOTES
RECEIVING UNIT ED HANDOFF REVIEW    ED Nurse Handoff Report was reviewed by: Tristin Ross RN on March 30, 2024 at 12:32 AM

## 2024-03-31 ENCOUNTER — APPOINTMENT (OUTPATIENT)
Dept: PHYSICAL THERAPY | Facility: CLINIC | Age: 89
End: 2024-03-31
Attending: INTERNAL MEDICINE
Payer: COMMERCIAL

## 2024-03-31 LAB
BACTERIA UR CULT: NORMAL
GLUCOSE BLDC GLUCOMTR-MCNC: 110 MG/DL (ref 70–99)
GLUCOSE BLDC GLUCOMTR-MCNC: 164 MG/DL (ref 70–99)
GLUCOSE BLDC GLUCOMTR-MCNC: 184 MG/DL (ref 70–99)
GLUCOSE BLDC GLUCOMTR-MCNC: 252 MG/DL (ref 70–99)
GLUCOSE BLDC GLUCOMTR-MCNC: 99 MG/DL (ref 70–99)

## 2024-03-31 PROCEDURE — 82962 GLUCOSE BLOOD TEST: CPT

## 2024-03-31 PROCEDURE — 96366 THER/PROPH/DIAG IV INF ADDON: CPT

## 2024-03-31 PROCEDURE — G0378 HOSPITAL OBSERVATION PER HR: HCPCS

## 2024-03-31 PROCEDURE — 97161 PT EVAL LOW COMPLEX 20 MIN: CPT | Mod: GP

## 2024-03-31 PROCEDURE — 99232 SBSQ HOSP IP/OBS MODERATE 35: CPT | Performed by: INTERNAL MEDICINE

## 2024-03-31 PROCEDURE — 97530 THERAPEUTIC ACTIVITIES: CPT | Mod: GP

## 2024-03-31 ASSESSMENT — ACTIVITIES OF DAILY LIVING (ADL)
ADLS_ACUITY_SCORE: 40
ADLS_ACUITY_SCORE: 41
ADLS_ACUITY_SCORE: 40
ADLS_ACUITY_SCORE: 41
ADLS_ACUITY_SCORE: 41
ADLS_ACUITY_SCORE: 40
ADLS_ACUITY_SCORE: 40
ADLS_ACUITY_SCORE: 41
ADLS_ACUITY_SCORE: 40
ADLS_ACUITY_SCORE: 40
ADLS_ACUITY_SCORE: 41
ADLS_ACUITY_SCORE: 40
ADLS_ACUITY_SCORE: 41
ADLS_ACUITY_SCORE: 40
ADLS_ACUITY_SCORE: 41
ADLS_ACUITY_SCORE: 41
ADLS_ACUITY_SCORE: 40
ADLS_ACUITY_SCORE: 41
ADLS_ACUITY_SCORE: 40
ADLS_ACUITY_SCORE: 40

## 2024-03-31 NOTE — PLAN OF CARE
Goal Outcome Evaluation:  PRIMARY Concern: Hyperglycemia, UTI   SAFETY RISK Concerns (fall risk, behaviors, etc.): Fall risk   Isolation/Type: None   Tests/Procedures for NEXT shift: BG monitoring, UC pending   Consults? (Pending/following, signed-off?) PT consult pending. CM following   Where is patient from? (Home, TCU, etc.): [facility] in [place]   Other Important info for NEXT shift: Patient speaks Nepali   Anticipated DC date & active delays: TBD      SUMMARY NOTE:     Orientation/Cognitive: A&O to self only. [facility]  Observation Goals (Met/ Not Met): Not met   Mobility Level/Assist Equipment: Ax1 GB/Walker   Antibiotics & Plan (IV/po, length of tx left): IV ceftriaxone for UTI   Pain Management: Denies pain   Tele/VS/O2: VSS on RA   ABNL Lab/BG: BG= 107,99  Diet: Regular   Bowel/Bladder: Continent   Skin Concerns: Right distal fifth toe scab  Drains/Devices: PIV SL   Patient Stated Goal for Today: Rest         Observation goals  PRIOR TO DISCHARGE       Comments:   -diagnostic tests and consults completed and resulted- Not met   -vital signs normal or at patient baseline- Met

## 2024-03-31 NOTE — PROGRESS NOTES
PRIMARY Concern: Hyperglycemia, UTI, abx completed              SAFETY RISK Concerns (fall risk, behaviors, etc.):  Fall risk, delirium risk      Isolation/Type: none  Tests/Procedures for NEXT shift: continue BG monitoring  Consults? (Pending/following, signed-off?) PT recommending home with assist vs home with home PT  Where is patient from? (Home, TCU, etc.): [facility] in [place]  Other Important info for NEXT shift: Pt is Georgian speaking, might have underlying dementia per daughter, not formally diagnosed, does better with in person  ( ordered for tomorrow), not available today as well. Virtual  ineffective 2/2 cognitive impairment and [place]. Daughter was here and assisted with language.   Anticipated DC date & active delays: possibly tomorrow pending improved glucose control.  _____________________________________________________________________________  SUMMARY NOTE:              (either paste note here OR complete the info below- RN to choose one- delete info below if not used)  Orientation/Cognitive: A but pleasantly confused, redirectable  Observation Goals (Met/ Not Met): Not met  Mobility Level/Assist Equipment: AX1 with Gb and walker  Antibiotics & Plan (IV/po, length of tx left):  IV ceftriaxone for UTI completed  Pain Management:  Denies pain  Tele/VS/O2:  VSS on RA  ABNL Lab/BG: BG anywhere from 100s-200s, insulin dosing readjusted today, please update provider with BG at dinner.   Diet: Mod carb insulin  Bowel/Bladder: Continent this shift  Skin Concerns: intact except for superficial bruising, R fifth toe gangrene  Drains/Devices:  PIV   Patient Stated Goal for Today:  To go home

## 2024-03-31 NOTE — PROVIDER NOTIFICATION
MD Notification    Notified Person: MD    Notified Person Name: Dr. Holguin    Notification Date/Time: 3/31/24 1648    Notification Interaction: [employer] Messaging    Purpose of Notification: FYI-  164 for 1700 check.    Orders Received: MD notified.    Comments:

## 2024-03-31 NOTE — PROGRESS NOTES
"   03/31/24 1111   Appointment Info   Signing Clinician's Name / Credentials (PT) Arely Cordova, TAMMY   Quick Adds   Quick Adds Certification       Present no   Language Attempted Israeli  on iPad, d/t pt's cognition, pt unable to understand . Pt not interacting or responding to  on iPad. Pt able to speak and understand some English, able to communicate needs throughout session.   Living Environment   People in Home facility resident   Current Living Arrangements assisted living   Home Accessibility no concerns   Living Environment Comments Pt unable to provide history, per chart pt from [facility]   Self-Care   Current Activity Tolerance fair   Equipment Currently Used at Home walker, standard   Activity/Exercise/Self-Care Comment Per chart, \"patient receives assistance with med management, bathing, all meals and occassional assistance with dressing and toileting when needed.  Patient uses a walker at baseline for mobility and although she does wear something for incontinence, she does not have many accidents.\"   General Information   Onset of Illness/Injury or Date of Surgery 03/29/24   Referring Physician Matt Holguin MD   Patient/Family Therapy Goals Statement (PT) Return home   Pertinent History of Current Problem (include personal factors and/or comorbidities that impact the POC) Deborah Zhou is a 89 year old female with a past medical history of type 2 diabetes and hypertension presents to hospital with hyperglycemia and a urinary tract infection.   Existing Precautions/Restrictions fall   Cognition   Affect/Mental Status (Cognition) unable/difficult to assess;confused   Orientation Status (Cognition) unable/difficult to assess   Follows Commands (Cognition) follows one-step commands;25-49% accuracy;unable/difficult to assess   Cognitive Status Comments Pt Israeli speaking and has dementia, does not understand virtual , no in person " . Pt speaks some English   Pain Assessment   Patient Currently in Pain No   Integumentary/Edema   Integumentary/Edema no deficits were identifed   Posture    Posture Forward head position;Protracted shoulders   Range of Motion (ROM)   Range of Motion ROM is WFL   Strength (Manual Muscle Testing)   Strength (Manual Muscle Testing) Deficits observed during functional mobility   Bed Mobility   Comment, (Bed Mobility) Not observed, pt in recliner upon arrival   Transfers   Comment, (Transfers) Sit to stand CGA w/ FWW   Gait/Stairs (Locomotion)   Comment, (Gait/Stairs) Pt amb w/ FWW and CGA   Balance   Balance Comments Good seated and fair standing w/ FWW   Clinical Impression   Criteria for Skilled Therapeutic Intervention Yes, treatment indicated   PT Diagnosis (PT) Impaired functional mobility and gait   Influenced by the following impairments Weakness, decreased activity tolerance, impaired balance   Functional limitations due to impairments Limited functional mobility requiring AD and assist   Clinical Presentation (PT Evaluation Complexity) stable   Clinical Presentation Rationale Based on PMH, current status, and social support   Clinical Decision Making (Complexity) low complexity   Planned Therapy Interventions (PT) balance training;bed mobility training;gait training;strengthening;transfer training;progressive activity/exercise   Risk & Benefits of therapy have been explained evaluation/treatment results reviewed;care plan/treatment goals reviewed;participants included;patient   PT Total Evaluation Time   PT Eval, Low Complexity Minutes (11689) 8   Therapy Certification   Start of care date 03/31/24   Certification date from 03/31/24   Certification date to 04/07/24   Medical Diagnosis Elevated lactic acid level   Physical Therapy Goals   PT Frequency Daily   PT Predicted Duration/Target Date for Goal Attainment 04/07/24   PT Goals Bed Mobility;Transfers;Gait   PT: Bed Mobility Independent;Supine  "to/from sit   PT: Transfers Modified independent;Sit to/from stand;Assistive device   PT: Gait Modified independent;Assistive device;50 feet   Interventions   Interventions Quick Adds Therapeutic Activity   Therapeutic Activity   Therapeutic Activities: dynamic activities to improve functional performance Minutes (56407) 10   Symptoms Noted During/After Treatment Fatigue   Treatment Detail/Skilled Intervention Pt in recliner upon therapist arrival, agreeable to PT. Pt reports she wants to \"walk.\" Pt requesting to don pants. Pt performed sit to stand w/ FWW and CGA, pt required assist to don pants. Pt amb 80' w/ FWW and CGA, pt demos slow speed and fair stability. Pt reported fatigue. Pt returned to recliner, required assist to doff pants. Pt able to shift hips back. All needs w/in reach, LE elevated, chair alarm on, RN updated.   PT Discharge Planning   PT Plan Progress transfers and amb w/ FWW   PT Discharge Recommendation (DC Rec) home with assist;home with home care physical therapy   PT Rationale for DC Rec Pt below baseline, currently Ax1 for mobility. Pt lives in [facility], per chart facility able to provide up to Ax2 for pt. Pt currently Ax1, recommend pt return to facility w/ assist for mobility and use of FWW. Pt would benefit from home PT to progress strength and activity tolerance   PT Brief overview of current status Ax1   Total Session Time   Timed Code Treatment Minutes 10   Total Session Time (sum of timed and untimed services) 18   [facility]  OUTPATIENT PHYSICAL THERAPY EVALUATION  PLAN OF TREATMENT FOR OUTPATIENT REHABILITATION  (COMPLETE FOR INITIAL CLAIMS ONLY)  Patient's Last Name, First Name, M.I.  YOB: 1924  Deborah Zhou                           Provider's Name  [facility] Medical Record No.  [id number]                             Onset Date:  03/29/24   Start of Care Date:  03/31/24   Type:     _X_PT   ___OT   ___SLP " Medical Diagnosis:  Elevated lactic acid level              PT Diagnosis:  Impaired functional mobility and gait Visits from SOC:  1     See note for plan of treatment, functional goals and certification details    I CERTIFY THE NEED FOR THESE SERVICES FURNISHED UNDER        THIS PLAN OF TREATMENT AND WHILE UNDER MY CARE     (Physician co-signature of this document indicates review and certification of the therapy plan).

## 2024-03-31 NOTE — PROGRESS NOTES
diagnostic tests and consults completed and resulted No, still tweaking insulin dosing, provider will keep pt one more night to monitor her sugar levels  -vital signs normal or at patient baseline Yes

## 2024-03-31 NOTE — PROGRESS NOTES
diagnostic tests and consults completed and resulted No, still tweaking insulin dosing, UC and PT eval pending  -vital signs normal or at patient baseline Yes      Top portion must ALWAYS be completed  PRIMARY Concern: Hyperglycemia, UTI  SAFETY RISK Concerns (fall risk, behaviors, etc.):  Fall risk, delirium risk      Isolation/Type: none  Tests/Procedures for NEXT shift: continue BG monitoring, UC pending  Consults? (Pending/following, signed-off?) PT consult pending  Where is patient from? (Home, TCU, etc.): [facility] in [place]  Other Important info for NEXT shift: Pt is Bulgarian speaking, might have underlying dementia per daughter, not formally diagnosed, does better with in person  ( ordered for tomorrow), not available today. Virtual  ineffective 2/2 cognitive impairment and [place]. Daughter was here and assisted with language.   Anticipated DC date & active delays:  possibly tomorrow pending consults and improved glucose control  _____________________________________________________________________________  SUMMARY NOTE:              (either paste note here OR complete the info below- RN to choose one- delete info below if not used)  Orientation/Cognitive: A but pleasantly confused, redirectable  Observation Goals (Met/ Not Met): Not met  Mobility Level/Assist Equipment: AX1 with Gb and walker  Antibiotics & Plan (IV/po, length of tx left):  IV ceftriaxone for UTI  Pain Management:  Denies pain  Tele/VS/O2:  VSS on RA  ABNL Lab/BG: BG anywhere from 100s-300s, insulin dosing readjusted today.  Diet: Mod carb insulin  Bowel/Bladder: Continent this shift  Skin Concerns: intact except for superficial bruising  Drains/Devices:  PIV infusing  Patient Stated Goal for Today:  To go home

## 2024-03-31 NOTE — PROGRESS NOTES
diagnostic tests and consults completed and resulted No, still tweaking insulin dosing, provider will keep pt one more night to monitor her sugar levels, UC and PT eval pending  -vital signs normal or at patient baseline Yes

## 2024-03-31 NOTE — PROGRESS NOTES
[facility]    Hospitalist Progress Note    Assessment & Plan   Deborah Zhou is a 89 year old female with a past medical history of type 2 diabetes and hypertension presents to hospital with hyperglycemia and a urinary tract infection.        Hyperglycemia  Type 2 diabetes  Patient presents to hospital with hyperglycemia seen on outpatient labs with blood sugar greater than 400.  A1c is 11.8% on day of presentation.  Her hyperglycemia is likely due to her urinary tract infection.  Blood sugar improved in the emergency room with 1 L of normal saline.  Feels well. Taking po well  BS improved to goal with lantus and prandial aspart  DM diet  No complaints  Pt's AL can administer insulin     Plan;    insulin sliding scale-- increase to moderate ISS  Hypoglycemia protocol  Hold glipizide- probably replace with insulin    prandial aspart 1 unit/10 gram carb  Dm diet  -lantus 10 units qPM         Lactic acidosis-resolved   The patient's UA is concerning for an infection.  Additionally she does have a lactic acidosis.  Unable to determine if the patient is experiencing any symptoms due to communication barriers.  I do not think the patient is experiencing sepsis but rather is likely dehydrated from hyperglycemia which was due to her UTI.  Nontoxic appearing  Ucx negative  Lactate 2  Pt doing well   No complaints.   Afebrile. Nl cbc with platelet count on admission   Stopped abx given neg cx. Pt received 2 doses of Ceftriaxone  Fluids stopped given good po      Hypertension  Patient is on PTA amlodipine, metoprolol, torsemide  Normotensive toda   Plan;   - cont to hold her torsemide in setting of lactic acidosis.  - cont to hold PTA antihypertensives for now as normotensive- ToprolXl, norvasc 5mg qd     CKD stage III  Creatinine is stable and compared to labs from 2023.  Monitor  Avoid nephrotoxic medications  Recheck bmp am     Right distal fifth toe scab versus gangrene  Her PCP is aware of the  finding and discussed options regarding treatment including surgical options.  The family and her PCP decided together that nonoperative management would be pursued.  -no pain. No signs infection. Follow for now, plan as above         DVT ppx: SCDs  Expected length of stay less than 2 days  Code Status: DNR/DNI    Dispo-  PT consult  Discharge- anticipate to AL on 4/1.     Moderate complexity, >35 min coord care with pt, rn, care coordinator, exam, pt seen with , charting, chart review.     Matt Holguin MD, MD  Text Page  (7am to 6pm)  Interval History   Seen with    Eating well. No complaints. No pain  No sob.     -Data reviewed today: I reviewed all new labs and imaging results over the last 24 hours. I personally reviewed labs and imaging since admission   Physical Exam   Temp: 97.8  F (36.6  C) Temp src: Oral BP: 120/67 Pulse: 77   Resp: 18 SpO2: 93 % O2 Device: None (Room air)    Vitals:    03/29/24 1710 03/30/24 0110   Weight: 51.7 kg (114 lb) 53.5 kg (117 lb 15.1 oz)     Vital Signs with Ranges  Temp:  [97.5  F (36.4  C)-98  F (36.7  C)] 97.8  F (36.6  C)  Pulse:  [60-77] 77  Resp:  [18] 18  BP: (117-143)/(58-75) 120/67  SpO2:  [93 %-97 %] 93 %  I/O last 3 completed shifts:  In: 580 [P.O.:580]  Out: 300 [Urine:300]    Constitutional: Up in chair, nad,  Neck: nl jvp  Respiratory: Slightly bibasilar crackles-improved  Breathing easily  Cardiovascular: rRR no r/g/m  GI: soft, nt nd  Skin/Integumen: no rash or edema  Neuro: nl speech, grossly nonfocal       Medications      insulin aspart   Subcutaneous TID w/meals    insulin aspart  1-7 Units Subcutaneous TID AC    insulin aspart  1-5 Units Subcutaneous At Bedtime    insulin glargine  10 Units Subcutaneous Q24H       Data   Recent Labs   Lab 03/31/24  0752 03/31/24  0151 03/30/24  2152 03/30/24  0809 03/30/24  0657 03/29/24 2116 03/29/24  1740   WBC  --   --   --   --   --   --  6.2   HGB  --   --   --   --   --   --  13.9    MCV  --   --   --   --   --   --  84   PLT  --   --   --   --   --   --  215   NA  --   --   --   --  140  --  135   POTASSIUM  --   --   --   --  4.7  --  4.8   CHLORIDE  --   --   --   --  107  --  98   CO2  --   --   --   --  20*  --  20*   BUN  --   --   --   --  30.3*  --  40.4*   CR  --   --   --   --  1.19*  --  1.33*   ANIONGAP  --   --   --   --  13  --  17*   RADHA  --   --   --   --  8.7  --  9.3   * 99 107*   < > 183*   < > 405*    < > = values in this interval not displayed.       Imaging:   No results found for this or any previous visit (from the past 24 hour(s)).

## 2024-03-31 NOTE — PROGRESS NOTES
Care Management Follow Up    Length of Stay (days): 0    Expected Discharge Date: 04/01/2024     Concerns to be Addressed: discharge planning     Patient plan of care discussed at interdisciplinary rounds: Yes    Anticipated Discharge Disposition: Assisted Living, Home Care     Anticipated Discharge Services:    Anticipated Discharge DME:      Patient/family educated on Medicare website which has current facility and service quality ratings:    Education Provided on the Discharge Plan: Yes  Patient/Family in Agreement with the Plan: yes    Referrals Placed by CM/SW:    Private pay costs discussed: Not applicable    Additional Information:  Writer notified by MD that discharge likely Monday.  Writer updated Catia at patient's [facility].  Awaiting PT recommendations.    Juanita Gallo RN Care Coordinator  [facility]  [phone]

## 2024-03-31 NOTE — PLAN OF CARE
Goal Outcome Evaluation:      Observation goals  PRIOR TO DISCHARGE        Comments: -diagnostic tests and consults completed and resulted- Not Met- Trying to titrate insulin before discharge  -vital signs normal or at patient baseline- Met  Nurse to notify provider when observation goals have been met and patient is ready for discharge.

## 2024-04-01 VITALS
BODY MASS INDEX: 23.54 KG/M2 | HEART RATE: 76 BPM | HEIGHT: 61 IN | TEMPERATURE: 98.7 F | DIASTOLIC BLOOD PRESSURE: 64 MMHG | RESPIRATION RATE: 18 BRPM | OXYGEN SATURATION: 98 % | WEIGHT: 124.7 LBS | SYSTOLIC BLOOD PRESSURE: 127 MMHG

## 2024-04-01 LAB
ANION GAP SERPL CALCULATED.3IONS-SCNC: 9 MMOL/L (ref 7–15)
BUN SERPL-MCNC: 28.6 MG/DL (ref 8–23)
CALCIUM SERPL-MCNC: 8.7 MG/DL (ref 8.2–9.6)
CHLORIDE SERPL-SCNC: 109 MMOL/L (ref 98–107)
CREAT SERPL-MCNC: 1.11 MG/DL (ref 0.51–0.95)
DEPRECATED HCO3 PLAS-SCNC: 21 MMOL/L (ref 22–29)
EGFRCR SERPLBLD CKD-EPI 2021: 44 ML/MIN/1.73M2
GLUCOSE BLDC GLUCOMTR-MCNC: 102 MG/DL (ref 70–99)
GLUCOSE BLDC GLUCOMTR-MCNC: 119 MG/DL (ref 70–99)
GLUCOSE BLDC GLUCOMTR-MCNC: 125 MG/DL (ref 70–99)
GLUCOSE BLDC GLUCOMTR-MCNC: 239 MG/DL (ref 70–99)
GLUCOSE SERPL-MCNC: 113 MG/DL (ref 70–99)
POTASSIUM SERPL-SCNC: 4.3 MMOL/L (ref 3.4–5.3)
SODIUM SERPL-SCNC: 139 MMOL/L (ref 135–145)

## 2024-04-01 PROCEDURE — 250N000013 HC RX MED GY IP 250 OP 250 PS 637: Performed by: INTERNAL MEDICINE

## 2024-04-01 PROCEDURE — 80048 BASIC METABOLIC PNL TOTAL CA: CPT | Performed by: INTERNAL MEDICINE

## 2024-04-01 PROCEDURE — 99239 HOSP IP/OBS DSCHRG MGMT >30: CPT | Performed by: INTERNAL MEDICINE

## 2024-04-01 PROCEDURE — 82962 GLUCOSE BLOOD TEST: CPT

## 2024-04-01 PROCEDURE — G0378 HOSPITAL OBSERVATION PER HR: HCPCS

## 2024-04-01 PROCEDURE — 36415 COLL VENOUS BLD VENIPUNCTURE: CPT | Performed by: INTERNAL MEDICINE

## 2024-04-01 RX ORDER — METOPROLOL SUCCINATE 50 MG/1
50 TABLET, EXTENDED RELEASE ORAL DAILY
Status: DISCONTINUED | OUTPATIENT
Start: 2024-04-01 | End: 2024-04-01 | Stop reason: HOSPADM

## 2024-04-01 RX ORDER — METOPROLOL SUCCINATE 50 MG/1
50 TABLET, EXTENDED RELEASE ORAL DAILY
Qty: 60 TABLET | Refills: 0 | Status: SHIPPED | OUTPATIENT
Start: 2024-04-02 | End: 2024-09-30

## 2024-04-01 RX ORDER — AMLODIPINE BESYLATE 5 MG/1
5 TABLET ORAL DAILY
Status: DISCONTINUED | OUTPATIENT
Start: 2024-04-01 | End: 2024-04-01 | Stop reason: HOSPADM

## 2024-04-01 RX ORDER — METOPROLOL SUCCINATE 25 MG/1
25 TABLET, EXTENDED RELEASE ORAL DAILY
Status: DISCONTINUED | OUTPATIENT
Start: 2024-04-01 | End: 2024-04-01

## 2024-04-01 RX ORDER — TORSEMIDE 10 MG/1
10 TABLET ORAL
Qty: 30 TABLET | Refills: 0 | Status: SHIPPED | OUTPATIENT
Start: 2024-04-02

## 2024-04-01 RX ORDER — TORSEMIDE 10 MG/1
10 TABLET ORAL DAILY
Status: SHIPPED
Start: 2024-04-02 | End: 2024-04-01

## 2024-04-01 RX ADMIN — METOPROLOL SUCCINATE 50 MG: 50 TABLET, EXTENDED RELEASE ORAL at 08:12

## 2024-04-01 RX ADMIN — AMLODIPINE BESYLATE 5 MG: 5 TABLET ORAL at 08:12

## 2024-04-01 ASSESSMENT — ACTIVITIES OF DAILY LIVING (ADL)
ADLS_ACUITY_SCORE: 40
ADLS_ACUITY_SCORE: 46
ADLS_ACUITY_SCORE: 40
ADLS_ACUITY_SCORE: 46

## 2024-04-01 NOTE — PLAN OF CARE
Goal Outcome Evaluation:    PRIMARY Concern: Hyperglycemia  SAFETY RISK Concerns (fall risk, behaviors, etc.): Fall risk      Isolation/Type: Na  Tests/Procedures for NEXT shift: na  Consults? (Pending/following, signed-off?) PT recommending home  PT  Where is patient from? (Home, TCU, etc.): [facility]  Other Important info for NEXT shift: Belarusian speaking, can understand a  few english words, [place]  Anticipated DC date & active delays: Possibly  / depending on BG control  _____________________________________________________________________________  SUMMARY NOTE:    Orientation/Cognitive: A&O to self, pleasantly confused  Observation Goals (Met/ Not Met): Partially met  Mobility Level/Assist Equipment: Ax1, GB, walker  Antibiotics & Plan (IV/po, length of tx left): NA  Pain Management: Denies pain   Tele/VS/O2: VSS on RA  ABNL Lab/BG: B  Diet: Mod/carb  Bowel/Bladder: Continent overnight  Skin Concerns: Right 5th toe scab vs gangrene  Drains/Devices: PIV:S/L  Patient Stated Goal for Today: unable to state goal   Observation goals  PRIOR TO DISCHARGE        Comments:   -diagnostic tests and consults completed and resultedMet  -vital signs normal or at patient baseline:Met  Nurse to notify provider when observation goals have been met and patient is ready for discharge.

## 2024-04-01 NOTE — PLAN OF CARE
Physical Therapy Discharge Summary    Reason for therapy discharge:    Discharged to home with home therapy.    Progress towards therapy goal(s). See goals on Care Plan in [facility] electronic health record for goal details.  Goals partially met.  Barriers to achieving goals:   discharge from facility.    Therapy recommendation(s):    Continued therapy is recommended.  Rationale/Recommendations:Pt below baseline, currently Ax1 for mobility. Pt lives in residential, per chart facility able to provide up to Ax2 for pt. Pt currently Ax1, recommend pt return to facility w/ assist for mobility and use of FWW. Pt would benefit from home PT to progress strength and activity tolerance  PT Brief overview of current status: Ax1    Recommendation above provided by last treating therapist.

## 2024-04-01 NOTE — PROGRESS NOTES
[id number]    PRIMARY Concern: Hyperglycemia  SAFETY RISK Concerns (fall risk, behaviors, etc.): Fall Risk      Isolation/Type: n/a  Tests/Procedures for NEXT shift: None  Consults? (Pending/following, signed-off?) PT- rec home PT  Where is patient from? (Home, TCU, etc.): [facility]  Other Important info for NEXT shift: [place]ese speaking- speaks little English, re-directable  Anticipated DC date & active delays: 4/1/24- Pending insulin dosing  _____________________________________________________________________________  SUMMARY NOTE:    Orientation/Cognitive: Alert- difficult mentation check- Pt able to verbalized uncertainty of each mentation question  Observation Goals (Met/ Not Met): Partially Met  Mobility Level/Assist Equipment: A-1 GB/walker  Antibiotics & Plan (IV/po, length of tx left): n/a  Pain Management: Denies  Tele/VS/O2: VSS on RA  ABNL Lab/BG: BG- 164, 187  Diet: Mod carb  Bowel/Bladder: Cont. B&B mostly  Skin Concerns: Black 5th Toe on R foot  Drains/Devices: IV SL  Patient Stated Goal for Today: n/a

## 2024-04-01 NOTE — PLAN OF CARE
Orientation: alert and oriented x1    Vitals/Tele: vitals stable on room air    IV Access/drains: PIV SL    Diet: mod/carb w/ novolog carb count    Mobility: a1 w/ gb/walker    GI/: continent     Wound/Skin: R 5th toe    Discharge Plan: home today    See Flow sheets for assessment

## 2024-04-01 NOTE — PLAN OF CARE
Goal Outcome Evaluation:  A but pleasantly confused. All discharge instructions reviewed with pt and daughter, all questions answered. All belongings returned to pt. Stable at time of discharge.

## 2024-04-01 NOTE — PROGRESS NOTES
Observation goals  PRIOR TO DISCHARGE        Comments:   -diagnostic tests and consults completed and resultedMet  -vital signs normal or at patient baseline:Met  Nurse to notify provider when observation goals have been met and patient is ready for discharge.

## 2024-04-01 NOTE — DISCHARGE INSTRUCTIONS
Insulin Dosing    Correction Scale - MEDIUM INSULIN RESISTANCE DOSING     Do Not give Correction Insulin if Pre-Meal BG less than 140.   For Pre-Meal  - 189 give 1 unit.   For Pre-Meal  - 239 give 2 units.   For Pre-Meal  - 289 give 3 units.   For Pre-Meal  - 339 give 4 units.   For Pre-Meal - 389 give 5 units.   For Pre-Meal -439 give 6 units  For Pre-Meal BG greater than or equal to 440 give 7 units.   To be given with prandial insulin, and based on pre-meal blood glucose. Administering insulin within 5 minutes of the start of the meal is ideal. Administer insulin no more than 30 minutes after the start of the meal, unless directed otherwise by provider.

## 2024-04-01 NOTE — DISCHARGE SUMMARY
[facility]    Discharge Summary  Hospitalist    Date of Admission:  3/29/2024  Date of Discharge:  4/1/2024  Discharging Provider: Matt Holguin MD, MD    Discharge Diagnoses   Hyperglycemia-resolved   Type 2 diabetes  Elevated lactate  Mild RAHUL from dehydration    History of Present Illness   Deborah Zhou is a 89 year old female with a past medical history of diabetes and hypertension presents to the hospital due to hyperglycemia.  The patient lives at an assisted living facility and was noted to have a black fifth digit on her right toe on Thursday, March 28.  She went to see her PCP the following day and gangrene was suspected of the digit.  Given her age risks and benefits were discussed with the family and nonoperative management with observation was recommended and agreed upon.  Routine blood work was drawn and the patient went home unfortunately the blood work revealed hyperglycemia.  Patient was notified that her blood sugars were in the 400s and brought to the hospital.  The patient herself is hard of hearing and reviewed to me speaking making communication very difficult with a telephone .  Most the history was obtained from her daughter.      Hospital Course   Deborah Zhou was admitted on 3/29/2024.  The following problems were addressed during her hospitalization:    Principal Problem:    Elevated lactic acid level  Active Problems:    UTI (urinary tract infection)    Hyperglycemia  Deborah Zhou is a 89 year old female with a past medical history of type 2 diabetes and hypertension presents to hospital with hyperglycemia and a urinary tract infection.        Hyperglycemia-resolved   Type 2 diabetes  Patient presents to hospital with hyperglycemia seen on outpatient labs with blood sugar greater than 400.  A1c is 11.8% on day of presentation.  Her hyperglycemia is likely due to her urinary tract infection.  Blood sugar improved in the emergency room with 1 L of  normal saline.  Feels well. Taking po well  BS improved to goal with lantus and prandial aspart  DM diet  No complaints  Pt's AL can administer insulin   -pt eating well.   Good control with transition to lantus and prandial aspart  Maintained on carb counting prandial aspart in hospital   range during day except runs low 200 at lunch    Plan at discharge-  Tid to qid accucheck  Prn glucose tabs  Stop PtA glipizide given risk for hypoglyemica  Start lantus 8 units at bedtime  Medium ISS tid with meals  Prandial novolog/aspart 4 units with each meal  Pcp follow up  Adjust insulin as needed.   Dm diet              Lactic acidosis-resolved   The patient's UA is concerning for an infection.  Additionally she does have a lactic acidosis.  Unable to determine if the patient is experiencing any symptoms due to communication barriers.  I do not think the patient is experiencing sepsis but rather is likely dehydrated from hyperglycemia which was due to her UTI.  Nontoxic appearing  Ucx negative  Lactate 2  Pt doing well   No complaints.   Afebrile. Nl cbc with platelet count on admission   Stopped abx given neg cx. Pt received 2 doses of Ceftriaxone  Fluids stopped given good po   -restart torsemide at reduced dose of every other day tomrrow  Bmp with pcp later this week     Hypertension  Patient is on PTA amlodipine, metoprolol, torsemide  All held initially,   Restarted Toprol XL at 1/2 dose of 50mg every day., cont at disharge  - restart Torsemide tomorrow but reduce dose to every other day given admit with elevated lactate  - restarted norvasc.   Pcp follow up 1 week      CKD stage III  Creatinine is stable and compared to labs from 2023.  -bmp improved. Mild RAHUL with Cr of 1.3 admission  Cr down to 1.1.   Taking po well  Restart torsemide at every other day- reduced dose  Bmp with pcp     Right distal fifth toe scab versus gangrene  Her PCP is aware of the finding and discussed options regarding treatment  including surgical options.  The family and her PCP decided together that nonoperative management would be pursued.  -no pain. No signs infection. Follow for now, plan as above           DVT ppx: SCDs  Expected length of stay less than 2 days  Code Status: DNR/DNI     Dispo-    Discharge- discharge back to AL with pcp follow up, home PT,     Matt Holguin MD, MD    Significant Results and Procedures   See hospital course    Pending Results   none  Unresulted Labs Ordered in the Past 30 Days of this Admission       No orders found from 2/28/2024 to 3/30/2024.            Code Status   DNR / DNI       Primary Care Physician   [facility]    Physical Exam   Temp: 98.7  F (37.1  C) Temp src: Oral BP: 127/64 Pulse: 76   Resp: 18 SpO2: 98 % O2 Device: None (Room air)    Vitals:    03/30/24 0110 03/31/24 0916 04/01/24 0500   Weight: 53.5 kg (117 lb 15.1 oz) 55.4 kg (122 lb 3.2 oz) 56.6 kg (124 lb 11.2 oz)     Vital Signs with Ranges  Temp:  [97.6  F (36.4  C)-98.8  F (37.1  C)] 98.7  F (37.1  C)  Pulse:  [72-86] 76  Resp:  [15-18] 18  BP: (127-156)/(61-75) 127/64  SpO2:  [92 %-98 %] 98 %  I/O last 3 completed shifts:  In: 480 [P.O.:480]  Out: -     Constitutional: Up in chair, nad,  Neck: nl jvp  Respiratory:  trace bibasilar crackles   Cardiovascular: rRR no r/g/m  GI: soft, nt nd  Skin/Integumen: no rash or edema  Right fifth toe with small area of possible necrosis. Skin intact, no change. No evidence of cellulitis, NT, no erythema   Neuro: nl speech, grossly nonfocal        Discharge Disposition   Discharged to assisted living  Condition at discharge: Good    Consultations This Hospital Stay   CARE MANAGEMENT / SOCIAL WORK IP CONSULT  PHYSICAL THERAPY ADULT IP CONSULT    Time Spent on this Encounter   I, Matt Holguin MD, personally saw the patient today and spent greater than 30 minutes discharging this patient.    Discharge Orders      Home Care Referral      Activity    Your activity upon  discharge: activity as tolerated     Patient care order    Check blood sugars three times per day  Call MD if blood sugar less than 70 or greater than 300     Reason for your hospital stay    Severe hyperglycemia, poorly controlled diabetes, HbA1C 12%  No UTI on urine culture     Patient care order    1. Do not give scheduled Novolog/aspart short acting insulin (4 units with meals) if patient does not eat the meal/skips the meal  2. Check blood sugars three times per day before meals. Check at bedtime daily if able to do that  3. Glucose tablets available as needed if patient has blood sugar less than 80     Follow-up and recommended labs and tests     Follow up appointment made for 4/5 at 1:20pm with Dr. Velasquez Huber  [address] [place] [address].   [place] [address]   [phone] (Work)     Patient care order    Insulin sliding scale for meal time:   Correction Scale - MEDIUM INSULIN RESISTANCE DOSING     Do Not give Correction Insulin if Pre-Meal BG less than 140.   For Pre-Meal  - 189 give 1 unit.   For Pre-Meal  - 239 give 2 units.   For Pre-Meal  - 289 give 3 units.   For Pre-Meal  - 339 give 4 units.   For Pre-Meal - 389 give 5 units.   For Pre-Meal -439 give 6 units  For Pre-Meal BG greater than or equal to 440 give 7 units.   To be given with prandial insulin, and based on pre-meal blood glucose. Administering insulin within 5 minutes of the start of the meal is ideal. Administer insulin no more than 30 minutes after the start of the meal, unless directed otherwise by provider.     Notify clinic doctor if glucose greater than or equal to 350 mg/dL after administration of correction dose.     Follow-up and recommended labs and tests     Follow up with primary care doctor 1 week  BMP in 1 weeks. HbA1C in 3 months- with PCP     Patient care order    Inspect patient's feet at least three times per week, daily if possible     No CPR- Do NOT Intubate     Diet    Follow this diet upon  discharge: Orders Placed This Encounter      Room Service      Moderate Consistent Carb (60 g CHO per Meal) Diet     Discharge Medications   Current Discharge Medication List        START taking these medications    Details   glucose (BD GLUCOSE) 4 g chewable tablet Take 1 tablet by mouth every hour as needed for low blood sugar  Qty: 100 tablet, Refills: 0    Comments: Future refills by PCP Dr. Joy Canton Becca with phone number [phone].  Associated Diagnoses: Type 2 diabetes, HbA1C goal < 8% (H)      !! insulin aspart (NOVOLOG PEN) 100 UNIT/ML pen Inject 4 Units Subcutaneous 3 times daily (with meals)  Qty: 15 mL, Refills: 0    Associated Diagnoses: Type 2 diabetes, HbA1C goal < 8% (H)      !! insulin aspart (NOVOLOG PEN) 100 UNIT/ML pen Inject 1-7 Units Subcutaneous 3 times daily (before meals)  Qty: 15 mL, Refills: 0    Associated Diagnoses: Type 2 diabetes, HbA1C goal < 8% (H)      insulin glargine (LANTUS PEN) 100 UNIT/ML pen Inject 8 Units Subcutaneous at bedtime  Qty: 15 mL, Refills: 1    Comments: If Lantus is not covered by insurance, may substitute Basaglar or Semglee or other insulin glargine product per insurance preference at same dose and frequency.    Associated Diagnoses: Type 2 diabetes, HbA1C goal < 8% (H)       !! - Potential duplicate medications found. Please discuss with provider.        CONTINUE these medications which have CHANGED    Details   metoprolol succinate ER (TOPROL XL) 50 MG 24 hr tablet Take 1 tablet (50 mg) by mouth daily  Qty: 60 tablet, Refills: 0    Comments: Future refills by PCP Dr. Benita Hudson with phone number [phone].  Associated Diagnoses: Essential hypertension      torsemide (DEMADEX) 10 MG tablet Take 1 tablet (10 mg) by mouth every 48 hours  Qty: 30 tablet, Refills: 0    Associated Diagnoses: Stage 3 chronic kidney disease, unspecified whether stage 3a or 3b CKD (H)           CONTINUE these medications which have NOT CHANGED     Details   acetaminophen (TYLENOL) 500 MG tablet Take 1,000 mg by mouth every 6 hours as needed for mild pain      amLODIPine (NORVASC) 5 MG tablet Take 5 mg by mouth daily      aspirin 81 MG EC tablet Take 81 mg by mouth daily      vitamin D3 (CHOLECALCIFEROL) 50 mcg (2000 units) tablet Take 1 tablet by mouth daily      ORDER FOR DME Equipment being ordered: post op shoe  Qty: 1 Device, Refills: 0    Associated Diagnoses: Injury of toe, left, initial encounter; Hematoma of toe, left, initial encounter           STOP taking these medications       glipiZIDE (GLUCOTROL) 5 MG tablet Comments:   Reason for Stopping:         glipiZIDE (GLUCOTROL) 5 MG tablet Comments:   Reason for Stopping:             Allergies   Allergies   Allergen Reactions    Gabapentin      Data   Most Recent 3 CBC's:  Recent Labs   Lab Test 03/29/24  1740 06/16/21  1150 12/26/19  0535   WBC 6.2 5.7 4.7   HGB 13.9 13.9 12.8   MCV 84 86 86    242 246      Most Recent 3 BMP's:  Recent Labs   Lab Test 04/01/24  1151 04/01/24  0806 04/01/24  0728 03/30/24  0809 03/30/24  0657 03/29/24  2116 03/29/24  1740   NA  --   --  139  --  140  --  135   POTASSIUM  --   --  4.3  --  4.7  --  4.8   CHLORIDE  --   --  109*  --  107  --  98   CO2  --   --  21*  --  20*  --  20*   BUN  --   --  28.6*  --  30.3*  --  40.4*   CR  --   --  1.11*  --  1.19*  --  1.33*   ANIONGAP  --   --  9  --  13  --  17*   RADHA  --   --  8.7  --  8.7  --  9.3   * 125* 113*   < > 183*   < > 405*    < > = values in this interval not displayed.     Most Recent 2 LFT's:  Recent Labs   Lab Test 06/16/21  1150 12/27/19  0755   AST 16 15   ALT 22 11   ALKPHOS 62 75   BILITOTAL 0.5 0.5     Most Recent INR's and Anticoagulation Dosing History:  Anticoagulation Dose History           No data to display              Most Recent 3 Troponin's:  Recent Labs   Lab Test 06/16/21  1150   TROPI <0.015     Most Recent Cholesterol Panel:  Recent Labs   Lab Test 12/26/19  0535   CHOL 208*       HDL 40*   TRIG 232*     Most Recent 6 Bacteria Isolates From Any Culture (See EPIC Reports for Culture Details):No lab results found.  Most Recent TSH, T4 and A1c Labs:  Recent Labs   Lab Test 03/29/24  1740 06/16/21  1150   TSH  --  1.16   A1C 12.2*  --      Results for orders placed or performed during the hospital encounter of 03/29/24   XR Chest 2 Views    Narrative    EXAM: XR CHEST 2 VIEWS  LOCATION: [facility]  DATE: 3/29/2024    INDICATION: Hyperglycemia, concern for pneumonia  COMPARISON: None.      Impression    IMPRESSION: Low lung volumes. Heart size and pulmonary vascularity normal. Subsegmental atelectasis in the bases. No acute infiltrates or effusions. Extensive atherosclerotic disease thoracic aorta. Scoliosis. Degenerative changes thoracic spine.

## 2024-04-25 ENCOUNTER — OFFICE VISIT (OUTPATIENT)
Dept: PODIATRY | Facility: CLINIC | Age: 89
End: 2024-04-25
Payer: COMMERCIAL

## 2024-04-25 VITALS — BODY MASS INDEX: 23.43 KG/M2 | DIASTOLIC BLOOD PRESSURE: 90 MMHG | WEIGHT: 124 LBS | SYSTOLIC BLOOD PRESSURE: 140 MMHG

## 2024-04-25 DIAGNOSIS — E11.65 POORLY CONTROLLED TYPE 2 DIABETES MELLITUS WITH PERIPHERAL NEUROPATHY (H): ICD-10-CM

## 2024-04-25 DIAGNOSIS — I73.9 PAD (PERIPHERAL ARTERY DISEASE) (H): Primary | ICD-10-CM

## 2024-04-25 DIAGNOSIS — E11.42 POORLY CONTROLLED TYPE 2 DIABETES MELLITUS WITH PERIPHERAL NEUROPATHY (H): ICD-10-CM

## 2024-04-25 PROCEDURE — 99204 OFFICE O/P NEW MOD 45 MIN: CPT | Performed by: PODIATRIST

## 2024-04-25 NOTE — PATIENT INSTRUCTIONS
"Thank you for choosing [facility]le [facility]!    DR. TOLEDO'S CLINIC LOCATIONS:     [facility] (Friday) TRIAGE LINE: [phone] [address] [facility]  APPOINTMENTS: [phone]   [place] [address] RADIOLOGY: [phone]    PHYSICAL THERAPY: [phone]    SET UP SURGERY: [phone]   [facility] (Mon-Tues AM-Thurs) BILLING QUESTIONS: [phone] [address] FAX: [phone]   [place] [address] [facility]: [phone]     You are seen today for concerns about the right fifth toe as well as a spot on the right second toe.  These are subacute/dried blood blisters, not gangrene.  There are no open wounds or signs of infection so wound care and antibiotics are not needed.  The right fifth nail is stable but may eventually fall off at some point.  Continue to monitor closely.    I did order blood flow studies to assess the blood flow down to the foot as her pulses are not readily palpable.  Call the above radiology number to schedule the imaging studies.  Schedule appointment in our office a few days later to review the results and to discuss treatment options.      DIABETES AND YOUR FEET  Diabetes can result in several problems in the feet including ulcers (open sores) and amputations. Two of the most important reasons why people develop foot problems when they have diabetes is : 1. Neuropathy (loss of feeling)  2. Vascular disease (loss or decrease of blood flow).    Neuropathy is a term used to describe a loss of nerve function.  Patients with diabetes are at risk of developing neuropathy if their sugars continue to run high and are above the normal value. One theory for neuropathy is that the \"extra\" sugar in the body enters the nerves and is broken down. These by-products build up in the nerve causing it to swell and impairing nerve function. Often times, this can be prevented by controlling your sugars, dieting and exercise.    When a person " develops neuropathy, they usually begin to feel numbness or tingling in their feet and sometime in their legs.  Other symptoms may include painful burning or hot feet, tingling or feeling like insects or ants are crawling on your feet or legs.  If the diabetes is sever and the sugars run high for long periods of time, neuropathy can also occur in the hands.    Vascular disease  is a term used to describe a loss or decrease in circulation (blood flow). There is a problem in getting blood and oxygen to areas that need it. Similar to neuropathy, sugars can build up in the walls of the arteries (blood vessels) and cause them to become swollen, thickened and hardened. This decreases the amount of blood that can go to an area that needs it. Though this is common in the legs of diabetic patients, it can also affect other arteries (blood vessels) in the body such as in the heart and eyes.    In the legs, vascular disease usually results in cramping. Patients who develop leg cramps after walking the same distance every time (i.e. One block, half a mile, ect.) need to let their doctors know so that their circulation may be checked. Cramps causing severe pain in the feet and/or legs while sleeping and the cramps go away when you stand or hang your legs off the side of the bed, may also be a sign of poor blood circulation.  Occasional cramping in cold weather or on rare occasions with activity may not be due to poor circulation, but you should inform your doctor.    PREVENTION OF THESE DISEASES  The key to prevention is good blood sugar control. Poor blood sugar control is a big reason many of these problems start. Physical activity (exercise) is a very good way to help decrease your blood sugars. Exercise can lower your blood sugar, blood pressure, and cholesterol. It also reduces your risk for heart disease and stroke, relieves stress, and strengthens your heart, muscles and bones.  In addition, regular activity helps insulin  "work better, improves your blood circulation, and keeps your joints flexible. If you're trying to lose weight, a combination of exercise and wise food choices can help you reach your target weight and maintain it.      PAIN MANAGEMENT (**Please speak with your primary doctor about any medications**)  1.Blood Sugar Control - Most important  2. Medications such as:  Amytriptylline, duloxetine, gabapentin, lyrica, tramadol (talk with your primary care doctor about this).     NUTRITION:  Nutrition is also important to help with healing. If your body does not have what it needs, it can't heal.   Increasing your protein intake is important.  With wounds you need 60-90gm of protein a day to help with healing. Over the counter protein shakes such as Henry, Glucerna, Ensure, ect... can help to supplement your daily protein intake.   It is also important to take Vitamins to help with healing.  Vitamins such as B12, B6 and Vitamin D3 are important for healing. These can be gotten over the counter at pharmacies or at stores like [employer] or the [employer].    I can also prescribe a dietary supplement called \"Rheumate\" that has a lot of essential vitamins in one capsule.  This may not be covered by insurance though.     FOOT CARE RECOMMENDATIONS   1. Wash your feet with lukewarm water and a mild soap and then dry them thoroughly, especially between the toes.     2. Examine your feet daily looking for cuts, corns, blisters, cracks, ect, especially after wearing new shoes. Make sure to look between your toes. If you cannot see the bottom of your feet, set a mirror on the floor and hold your foot over it, or ask a spouse, friend or family member to examine your feet for you. Contact your doctor immediately if new problems are noted or if sores are not healing.     3. Immediately apply moisturizer to the tops and bottoms of your feet, avoiding areas between the toes. Hand lotion (Intesive Care, Birgit, Eucerin, Neutrogena, Curel, ect) " is sufficient unless your doctor prescribes a medicated lotion. Apply sunscreen to your feet when going swimming outside.     4. Use clean comfortable shoes, wear white socks (if you have any bleeding or drainage, you will see it on white socks). Socks should not have thick seams or cut off the circulation around the leg. Break in new shoes slowly and rotate with older shoes until broken in. Check the inside of your shoes with your hand to look for areas of irritation or objects that may have fallen into your shoes.       5. Keep slippers by the side of your bed for use during the night.     6.  Shoes should be fitted by a professional and should not cause areas of irritation.  Check your feet regularly when wearing a new pair of shoes and replace them as needed.     7.  Talk to your doctor about proper exercise. Exercise and stretching stimulate blood flow to your feet and maintain proper glucose levels.     8.  Monitor your blood glucose level as instructed by your doctor. Notify your doctor immediately if your blood sugar is abnormally high or low.    9. Cut your nails straight across, but then gently round any sharp edges with a cardboard nail file. If you have neuropathy, peripheral vascular disease or cannot see that well to trim your own toenails contact [facility] ([phone]) or [facility] ([phone]).      THINGS TO AVOID DOING   1.  Do not soak your feet if you have an open sore. Use only lukewarm water and always check the temperature with your hand as hot water can easily burn your feet.       2.  Never use a hot water bottle or heating pad on your feet. Also do not apply cold compresses to your feet. With decreased sensation, you could burn or freeze your feet.       3.  Do not apply any of these to your feet:    -  Over the counter medicine for corns or warts    -  Harsh chemicals like boric acid    -  Do not self-treat corns, cuts, blisters or infections. Always consult your doctor.       4.   Do not wear sandals, slippers or walk barefoot, especially on hot sand or concrete or other harsh surfaces.     5.  If you smoke, stop!!!        Here is a list of routine foot care resources, which includes toenail trimming and callus/corn management.     This is not a referral. It is your responsibility to contact the organization and your insurance to confirm cost and coverage.      ROUTINE FOOT CARE (NAIL TRIMMING / CALLUSES)      [facility]  [phone]   [facility]  [phone]  Multiple locations   [facility]  [phone] Dr. Jacobs and Dr. Potter  [address]  [place] [address]  [phone]   [facility]  [phone]  [email or web address]

## 2024-04-25 NOTE — LETTER
"    4/25/2024         RE: Deborah Zhou  [address]  [facility] [address]        Dear Colleague,    Thank you for referring your patient, Deborah Zhou, to the [facility]. Please see a copy of my visit note below.    Foot & Ankle Surgery  April 25, 2024    CC: \"Gangrene\"    I was asked to see Deborah Zhou regarding the chief complaint by: Self    HPI:  Pt is a 89 year old female who presents with above complaint.  The patient was recently seen at [facility] for multiple issues including distal right fifth toe \"scab versus gangrene\".  The family and the patient's PCP decided that nonoperative management would be pursued.  She states this has been present for months.  She presents with her granddaughter.  They also have concerns about a spot on the right second toe.  No imaging.  The patient is an uncontrolled diabetic, her most recent A1c is over 12..  While she states that she has \"no pain\", she states that her right foot can hurt.    ROS:   Pos for CC.  The patient denies current nausea, vomiting, chills, fevers, belly pain, calf pain, chest pain or SOB.  Complete remainder of ROS is otherwise neg.    VITALS:    Vitals:    04/25/24 0903   BP: (!) 140/90   Weight: 56.2 kg (124 lb)       PMH:  No past medical history on file.    SXHX:  No past surgical history on file.     MEDS:    Current Outpatient Medications   Medication Sig Dispense Refill     acetaminophen (TYLENOL) 500 MG tablet Take 1,000 mg by mouth every 6 hours as needed for mild pain       amLODIPine (NORVASC) 5 MG tablet Take 5 mg by mouth daily       aspirin 81 MG EC tablet Take 81 mg by mouth daily       glucose (BD GLUCOSE) 4 g chewable tablet Take 1 tablet by mouth every hour as needed for low blood sugar 100 tablet 0     insulin aspart (NOVOLOG PEN) 100 UNIT/ML pen Inject 4 Units Subcutaneous 3 times daily (with meals) 15 mL 0     insulin aspart (NOVOLOG PEN) 100 UNIT/ML pen Inject 1-7 Units Subcutaneous 3 " times daily (before meals) 15 mL 0     insulin glargine (LANTUS PEN) 100 UNIT/ML pen Inject 8 Units Subcutaneous at bedtime 15 mL 1     insulin pen needle (32G X 4 MM) 32G X 4 MM miscellaneous Use 7 pen needles daily or as directed. 100 each 2     metoprolol succinate ER (TOPROL XL) 50 MG 24 hr tablet Take 1 tablet (50 mg) by mouth daily 60 tablet 0     ORDER FOR DME Equipment being ordered: post op shoe 1 Device 0     torsemide (DEMADEX) 10 MG tablet Take 1 tablet (10 mg) by mouth every 48 hours 30 tablet 0     vitamin D3 (CHOLECALCIFEROL) 50 mcg (2000 units) tablet Take 1 tablet by mouth daily       No current facility-administered medications for this visit.       ALL:     Allergies   Allergen Reactions     Gabapentin        FMH:  No family history on file.    SocHx:    Social History     Socioeconomic History     Marital status: Single     Spouse name: Not on file     Number of children: Not on file     Years of education: Not on file     Highest education level: Not on file   Occupational History     Not on file   Tobacco Use     Smoking status: Never     Smokeless tobacco: Never   Substance and Sexual Activity     Alcohol use: Not on file     Drug use: Not on file     Sexual activity: Not on file   Other Topics Concern     Not on file   Social History Narrative     Not on file     Social Determinants of Health     Financial Resource Strain: Not on file   Food Insecurity: Not on file   Transportation Needs: Not on file   Physical Activity: Not on file   Stress: Not on file   Social Connections: Not on file   Interpersonal Safety: Not on file   Housing Stability: Not on file           EXAMINATION:  Gen:   No apparent distress  Neuro:   A&Ox3, no deficits  Psych:    Answering questions appropriately for age and situation with normal affect  Head:    NCAT  Eye:    Visual scanning without deficit  Ear:    Response to auditory stimuli wnl  Lung:    Non-labored breathing on RA noted  Abd:    NTND per patient  report  Lymph:    Neg for pitting/non-pitting edema BLE  Vasc:    Pulses are nonpalpable right lower extremity  Neuro:    Light touch sensation diminished distally  Derm:    There is no gangrene in her right foot.  Rather the discoloration of the distal aspect of the right fifth toe, as well as the spot at the distal dorsal right second toe, are subacute/dried blood blisters.  These were peeled off at the fifth toe and the underlying skin appears healed.  There are no open wounds.  The nail itself is firmly adhered but is involved and anticipate this may fall off at some point.  No paronychia or infection however are seen today  MSK:    ROM, strength wnl without limitation, no pain on palpation noted.  Calf:    Neg for redness, swelling or tenderness      Assessment:  89 year old female with subacute/dried blood blister right fifth and second toe in setting of PAD and uncontrolled diabetes mellitus with peripheral neuropathy      Medical Decision Making/Plan:  Discussed etiologies, anatomy and options  1.  Subacute/dried blood blister right fifth and second toe in setting of PAD and uncontrolled diabetes mellitus with peripheral neuropathy  -I personally reviewed and interpreted the patient's lower extremity history pertinent to today's visit, including imaging/labs, in preparation for initiating a treatment program.  -Regarding the patient's diabetes, we dispensed and discussed proper diabetic foot care.  This includes closely monitoring their blood sugars, closely monitoring their blood pressures, and evaluating their feet at least once per day.  We also discussed potential problems associated with barefoot/sock ambulation and soaking their feet.    -Our routine footcare handout was dispensed  -These are blood blisters that are dried.  No underlying wounds are seen and there are no signs of infection so no wound care or antibiotics are indicated.  -I am not able to palpate her pulses so noninvasive vascular  studies were ordered.  She will follow-up with me after to review the results and to discuss treatment options.  -All routine footcare handout was dispensed      Follow up: After noninvasive vascular studies to review results or sooner with acute issues      Patient's medical history was reviewed today      Tristen Cmaargo DPM [facility] FACFA  Podiatric Foot & Ankle Surgeon  [facility]  [phone]    Disclaimer: This note consists of symbols derived from keyboarding, dictation and/or voice recognition software. As a result, there may be errors in the script that have gone undetected. Please consider this when interpreting information found in this chart.          Again, thank you for allowing me to participate in the care of your patient.        Sincerely,        Tristen Camargo DPM, YO

## 2024-04-25 NOTE — PROGRESS NOTES
"Foot & Ankle Surgery  April 25, 2024    CC: \"Gangrene\"    I was asked to see Deborah Zhou regarding the chief complaint by: Self    HPI:  Pt is a 89 year old female who presents with above complaint.  The patient was recently seen at [facility] for multiple issues including distal right fifth toe \"scab versus gangrene\".  The family and the patient's PCP decided that nonoperative management would be pursued.  She states this has been present for months.  She presents with her granddaughter.  They also have concerns about a spot on the right second toe.  No imaging.  The patient is an uncontrolled diabetic, her most recent A1c is over 12..  While she states that she has \"no pain\", she states that her right foot can hurt.    ROS:   Pos for CC.  The patient denies current nausea, vomiting, chills, fevers, belly pain, calf pain, chest pain or SOB.  Complete remainder of ROS is otherwise neg.    VITALS:    Vitals:    04/25/24 0903   BP: (!) 140/90   Weight: 56.2 kg (124 lb)       PMH:  No past medical history on file.    SXHX:  No past surgical history on file.     MEDS:    Current Outpatient Medications   Medication Sig Dispense Refill    acetaminophen (TYLENOL) 500 MG tablet Take 1,000 mg by mouth every 6 hours as needed for mild pain      amLODIPine (NORVASC) 5 MG tablet Take 5 mg by mouth daily      aspirin 81 MG EC tablet Take 81 mg by mouth daily      glucose (BD GLUCOSE) 4 g chewable tablet Take 1 tablet by mouth every hour as needed for low blood sugar 100 tablet 0    insulin aspart (NOVOLOG PEN) 100 UNIT/ML pen Inject 4 Units Subcutaneous 3 times daily (with meals) 15 mL 0    insulin aspart (NOVOLOG PEN) 100 UNIT/ML pen Inject 1-7 Units Subcutaneous 3 times daily (before meals) 15 mL 0    insulin glargine (LANTUS PEN) 100 UNIT/ML pen Inject 8 Units Subcutaneous at bedtime 15 mL 1    insulin pen needle (32G X 4 MM) 32G X 4 MM miscellaneous Use 7 pen needles daily or as directed. 100 each 2    metoprolol " succinate ER (TOPROL XL) 50 MG 24 hr tablet Take 1 tablet (50 mg) by mouth daily 60 tablet 0    ORDER FOR DME Equipment being ordered: post op shoe 1 Device 0    torsemide (DEMADEX) 10 MG tablet Take 1 tablet (10 mg) by mouth every 48 hours 30 tablet 0    vitamin D3 (CHOLECALCIFEROL) 50 mcg (2000 units) tablet Take 1 tablet by mouth daily       No current facility-administered medications for this visit.       ALL:     Allergies   Allergen Reactions    Gabapentin        FMH:  No family history on file.    SocHx:    Social History     Socioeconomic History    Marital status: Single     Spouse name: Not on file    Number of children: Not on file    Years of education: Not on file    Highest education level: Not on file   Occupational History    Not on file   Tobacco Use    Smoking status: Never    Smokeless tobacco: Never   Substance and Sexual Activity    Alcohol use: Not on file    Drug use: Not on file    Sexual activity: Not on file   Other Topics Concern    Not on file   Social History Narrative    Not on file     Social Determinants of Health     Financial Resource Strain: Not on file   Food Insecurity: Not on file   Transportation Needs: Not on file   Physical Activity: Not on file   Stress: Not on file   Social Connections: Not on file   Interpersonal Safety: Not on file   Housing Stability: Not on file           EXAMINATION:  Gen:   No apparent distress  Neuro:   A&Ox3, no deficits  Psych:    Answering questions appropriately for age and situation with normal affect  Head:    NCAT  Eye:    Visual scanning without deficit  Ear:    Response to auditory stimuli wnl  Lung:    Non-labored breathing on RA noted  Abd:    NTND per patient report  Lymph:    Neg for pitting/non-pitting edema BLE  Vasc:    Pulses are nonpalpable right lower extremity  Neuro:    Light touch sensation diminished distally  Derm:    There is no gangrene in her right foot.  Rather the discoloration of the distal aspect of the right fifth  toe, as well as the spot at the distal dorsal right second toe, are subacute/dried blood blisters.  These were peeled off at the fifth toe and the underlying skin appears healed.  There are no open wounds.  The nail itself is firmly adhered but is involved and anticipate this may fall off at some point.  No paronychia or infection however are seen today  MSK:    ROM, strength wnl without limitation, no pain on palpation noted.  Calf:    Neg for redness, swelling or tenderness      Assessment:  89 year old female with subacute/dried blood blister right fifth and second toe in setting of PAD and uncontrolled diabetes mellitus with peripheral neuropathy      Medical Decision Making/Plan:  Discussed etiologies, anatomy and options  1.  Subacute/dried blood blister right fifth and second toe in setting of PAD and uncontrolled diabetes mellitus with peripheral neuropathy  -I personally reviewed and interpreted the patient's lower extremity history pertinent to today's visit, including imaging/labs, in preparation for initiating a treatment program.  -Regarding the patient's diabetes, we dispensed and discussed proper diabetic foot care.  This includes closely monitoring their blood sugars, closely monitoring their blood pressures, and evaluating their feet at least once per day.  We also discussed potential problems associated with barefoot/sock ambulation and soaking their feet.    -Our routine footcare handout was dispensed  -These are blood blisters that are dried.  No underlying wounds are seen and there are no signs of infection so no wound care or antibiotics are indicated.  -I am not able to palpate her pulses so noninvasive vascular studies were ordered.  She will follow-up with me after to review the results and to discuss treatment options.  -All routine footcare handout was dispensed      Follow up: After noninvasive vascular studies to review results or sooner with acute issues      Patient's medical history  was reviewed today      Tristen Camargo DPM FACFAS FACFAOM  Podiatric Foot & Ankle Surgeon  [facility]  [phone]    Disclaimer: This note consists of symbols derived from keyboarding, dictation and/or voice recognition software. As a result, there may be errors in the script that have gone undetected. Please consider this when interpreting information found in this chart.

## 2024-09-30 ENCOUNTER — HOSPITAL ENCOUNTER (INPATIENT)
Facility: CLINIC | Age: 89
LOS: 4 days | Discharge: HOME HEALTH | DRG: 689 | End: 2024-10-04
Attending: EMERGENCY MEDICINE | Admitting: HOSPITALIST
Payer: COMMERCIAL

## 2024-09-30 ENCOUNTER — APPOINTMENT (OUTPATIENT)
Dept: CT IMAGING | Facility: CLINIC | Age: 89
DRG: 689 | End: 2024-09-30
Attending: EMERGENCY MEDICINE
Payer: COMMERCIAL

## 2024-09-30 DIAGNOSIS — F03.90 DEMENTIA, UNSPECIFIED DEMENTIA SEVERITY, UNSPECIFIED DEMENTIA TYPE, UNSPECIFIED WHETHER BEHAVIORAL, PSYCHOTIC, OR MOOD DISTURBANCE OR ANXIETY (H): ICD-10-CM

## 2024-09-30 DIAGNOSIS — N39.0 ACUTE UTI: ICD-10-CM

## 2024-09-30 DIAGNOSIS — R41.0 DELIRIUM: ICD-10-CM

## 2024-09-30 DIAGNOSIS — R33.9 URINARY RETENTION: Primary | ICD-10-CM

## 2024-09-30 LAB
ALBUMIN SERPL BCG-MCNC: 2.7 G/DL (ref 3.5–5.2)
ALBUMIN UR-MCNC: 100 MG/DL
ALP SERPL-CCNC: ABNORMAL U/L
ALT SERPL W P-5'-P-CCNC: ABNORMAL U/L
AMORPH CRY #/AREA URNS HPF: ABNORMAL /HPF
ANION GAP SERPL CALCULATED.3IONS-SCNC: 10 MMOL/L (ref 7–15)
APPEARANCE UR: ABNORMAL
AST SERPL W P-5'-P-CCNC: ABNORMAL U/L
BACTERIA #/AREA URNS HPF: ABNORMAL /HPF
BASOPHILS # BLD AUTO: 0 10E3/UL (ref 0–0.2)
BASOPHILS NFR BLD AUTO: 1 %
BILIRUB SERPL-MCNC: 0.2 MG/DL
BILIRUB UR QL STRIP: NEGATIVE
BUN SERPL-MCNC: 36.2 MG/DL (ref 8–23)
CALCIUM SERPL-MCNC: 7 MG/DL (ref 8.8–10.4)
CHLORIDE SERPL-SCNC: 106 MMOL/L (ref 98–107)
COLOR UR AUTO: ABNORMAL
CREAT SERPL-MCNC: 1.43 MG/DL (ref 0.51–0.95)
EGFRCR SERPLBLD CKD-EPI 2021: 33 ML/MIN/1.73M2
EOSINOPHIL # BLD AUTO: 0.2 10E3/UL (ref 0–0.7)
EOSINOPHIL NFR BLD AUTO: 2 %
ERYTHROCYTE [DISTWIDTH] IN BLOOD BY AUTOMATED COUNT: 12.5 % (ref 10–15)
GLUCOSE SERPL-MCNC: 277 MG/DL (ref 70–99)
GLUCOSE UR STRIP-MCNC: 1000 MG/DL
HCO3 SERPL-SCNC: 19 MMOL/L (ref 22–29)
HCT VFR BLD AUTO: 36.1 % (ref 35–47)
HGB BLD-MCNC: 12 G/DL (ref 11.7–15.7)
HGB UR QL STRIP: ABNORMAL
HOLD SPECIMEN: NORMAL
IMM GRANULOCYTES # BLD: 0 10E3/UL
IMM GRANULOCYTES NFR BLD: 0 %
KETONES UR STRIP-MCNC: NEGATIVE MG/DL
LEUKOCYTE ESTERASE UR QL STRIP: ABNORMAL
LYMPHOCYTES # BLD AUTO: 1.3 10E3/UL (ref 0.8–5.3)
LYMPHOCYTES NFR BLD AUTO: 21 %
MCH RBC QN AUTO: 28.4 PG (ref 26.5–33)
MCHC RBC AUTO-ENTMCNC: 33.2 G/DL (ref 31.5–36.5)
MCV RBC AUTO: 85 FL (ref 78–100)
MONOCYTES # BLD AUTO: 0.5 10E3/UL (ref 0–1.3)
MONOCYTES NFR BLD AUTO: 8 %
NEUTROPHILS # BLD AUTO: 4.2 10E3/UL (ref 1.6–8.3)
NEUTROPHILS NFR BLD AUTO: 68 %
NITRATE UR QL: NEGATIVE
NRBC # BLD AUTO: 0 10E3/UL
NRBC BLD AUTO-RTO: 0 /100
PH UR STRIP: 6 [PH] (ref 5–7)
PLATELET # BLD AUTO: 227 10E3/UL (ref 150–450)
POTASSIUM SERPL-SCNC: 5.7 MMOL/L (ref 3.4–5.3)
PROCALCITONIN SERPL IA-MCNC: 0.04 NG/ML
PROT SERPL-MCNC: 5.5 G/DL (ref 6.4–8.3)
RBC # BLD AUTO: 4.23 10E6/UL (ref 3.8–5.2)
RBC URINE: 6 /HPF
SODIUM SERPL-SCNC: 135 MMOL/L (ref 135–145)
SP GR UR STRIP: 1.01 (ref 1–1.03)
UROBILINOGEN UR STRIP-MCNC: NORMAL MG/DL
WBC # BLD AUTO: 6.2 10E3/UL (ref 4–11)
WBC CLUMPS #/AREA URNS HPF: PRESENT /HPF
WBC URINE: 127 /HPF

## 2024-09-30 PROCEDURE — 85004 AUTOMATED DIFF WBC COUNT: CPT | Performed by: EMERGENCY MEDICINE

## 2024-09-30 PROCEDURE — 84145 PROCALCITONIN (PCT): CPT | Performed by: EMERGENCY MEDICINE

## 2024-09-30 PROCEDURE — 36415 COLL VENOUS BLD VENIPUNCTURE: CPT | Performed by: EMERGENCY MEDICINE

## 2024-09-30 PROCEDURE — 93005 ELECTROCARDIOGRAM TRACING: CPT

## 2024-09-30 PROCEDURE — 258N000003 HC RX IP 258 OP 636: Performed by: EMERGENCY MEDICINE

## 2024-09-30 PROCEDURE — 87186 SC STD MICRODIL/AGAR DIL: CPT | Performed by: EMERGENCY MEDICINE

## 2024-09-30 PROCEDURE — 70450 CT HEAD/BRAIN W/O DYE: CPT

## 2024-09-30 PROCEDURE — 84155 ASSAY OF PROTEIN SERUM: CPT | Performed by: EMERGENCY MEDICINE

## 2024-09-30 PROCEDURE — 81001 URINALYSIS AUTO W/SCOPE: CPT | Performed by: EMERGENCY MEDICINE

## 2024-09-30 PROCEDURE — 96374 THER/PROPH/DIAG INJ IV PUSH: CPT

## 2024-09-30 PROCEDURE — 250N000011 HC RX IP 250 OP 636: Performed by: EMERGENCY MEDICINE

## 2024-09-30 PROCEDURE — 96361 HYDRATE IV INFUSION ADD-ON: CPT

## 2024-09-30 PROCEDURE — 99223 1ST HOSP IP/OBS HIGH 75: CPT | Performed by: HOSPITALIST

## 2024-09-30 PROCEDURE — 120N000001 HC R&B MED SURG/OB

## 2024-09-30 PROCEDURE — 99285 EMERGENCY DEPT VISIT HI MDM: CPT | Mod: 25

## 2024-09-30 RX ORDER — DEXTROSE MONOHYDRATE 25 G/50ML
25-50 INJECTION, SOLUTION INTRAVENOUS
Status: DISCONTINUED | OUTPATIENT
Start: 2024-09-30 | End: 2024-10-04 | Stop reason: HOSPADM

## 2024-09-30 RX ORDER — METOPROLOL SUCCINATE 100 MG/1
100 TABLET, EXTENDED RELEASE ORAL DAILY
COMMUNITY

## 2024-09-30 RX ORDER — AMOXICILLIN 250 MG
2 CAPSULE ORAL 2 TIMES DAILY PRN
Status: DISCONTINUED | OUTPATIENT
Start: 2024-09-30 | End: 2024-10-04 | Stop reason: HOSPADM

## 2024-09-30 RX ORDER — PROCHLORPERAZINE 25 MG
12.5 SUPPOSITORY, RECTAL RECTAL EVERY 12 HOURS PRN
Status: DISCONTINUED | OUTPATIENT
Start: 2024-09-30 | End: 2024-10-04 | Stop reason: HOSPADM

## 2024-09-30 RX ORDER — ACETAMINOPHEN 650 MG/1
650 SUPPOSITORY RECTAL EVERY 4 HOURS PRN
Status: DISCONTINUED | OUTPATIENT
Start: 2024-09-30 | End: 2024-10-04 | Stop reason: HOSPADM

## 2024-09-30 RX ORDER — SODIUM CHLORIDE 9 MG/ML
INJECTION, SOLUTION INTRAVENOUS CONTINUOUS
Status: DISCONTINUED | OUTPATIENT
Start: 2024-09-30 | End: 2024-10-01

## 2024-09-30 RX ORDER — CALCIUM GLUCONATE 94 MG/ML
1 INJECTION, SOLUTION INTRAVENOUS ONCE
Status: COMPLETED | OUTPATIENT
Start: 2024-09-30 | End: 2024-10-01

## 2024-09-30 RX ORDER — AMOXICILLIN 250 MG
1 CAPSULE ORAL 2 TIMES DAILY PRN
Status: DISCONTINUED | OUTPATIENT
Start: 2024-09-30 | End: 2024-10-04 | Stop reason: HOSPADM

## 2024-09-30 RX ORDER — AMOXICILLIN 250 MG
2 CAPSULE ORAL 2 TIMES DAILY
Status: DISCONTINUED | OUTPATIENT
Start: 2024-09-30 | End: 2024-10-04 | Stop reason: HOSPADM

## 2024-09-30 RX ORDER — ACETAMINOPHEN 325 MG/1
650 TABLET ORAL EVERY 4 HOURS PRN
Status: DISCONTINUED | OUTPATIENT
Start: 2024-09-30 | End: 2024-10-04 | Stop reason: HOSPADM

## 2024-09-30 RX ORDER — CEFTRIAXONE 1 G/1
1 INJECTION, POWDER, FOR SOLUTION INTRAMUSCULAR; INTRAVENOUS ONCE
Status: COMPLETED | OUTPATIENT
Start: 2024-09-30 | End: 2024-09-30

## 2024-09-30 RX ORDER — HYDRALAZINE HYDROCHLORIDE 20 MG/ML
10 INJECTION INTRAMUSCULAR; INTRAVENOUS EVERY 4 HOURS PRN
Status: DISCONTINUED | OUTPATIENT
Start: 2024-09-30 | End: 2024-10-04 | Stop reason: HOSPADM

## 2024-09-30 RX ORDER — LIDOCAINE 40 MG/G
CREAM TOPICAL
Status: DISCONTINUED | OUTPATIENT
Start: 2024-09-30 | End: 2024-10-04 | Stop reason: HOSPADM

## 2024-09-30 RX ORDER — CEFTRIAXONE 1 G/1
1 INJECTION, POWDER, FOR SOLUTION INTRAMUSCULAR; INTRAVENOUS EVERY 24 HOURS
Status: DISCONTINUED | OUTPATIENT
Start: 2024-10-01 | End: 2024-10-02

## 2024-09-30 RX ORDER — NICOTINE POLACRILEX 4 MG
15-30 LOZENGE BUCCAL
Status: DISCONTINUED | OUTPATIENT
Start: 2024-09-30 | End: 2024-10-04 | Stop reason: HOSPADM

## 2024-09-30 RX ORDER — PROCHLORPERAZINE MALEATE 5 MG
5 TABLET ORAL EVERY 6 HOURS PRN
Status: DISCONTINUED | OUTPATIENT
Start: 2024-09-30 | End: 2024-10-04 | Stop reason: HOSPADM

## 2024-09-30 RX ORDER — AMOXICILLIN 250 MG
1 CAPSULE ORAL 2 TIMES DAILY
Status: DISCONTINUED | OUTPATIENT
Start: 2024-09-30 | End: 2024-10-04 | Stop reason: HOSPADM

## 2024-09-30 RX ORDER — HEPARIN SODIUM 5000 [USP'U]/.5ML
5000 INJECTION, SOLUTION INTRAVENOUS; SUBCUTANEOUS EVERY 8 HOURS
Status: DISCONTINUED | OUTPATIENT
Start: 2024-10-01 | End: 2024-10-04 | Stop reason: HOSPADM

## 2024-09-30 RX ORDER — GLIPIZIDE 5 MG/1
5 TABLET, FILM COATED, EXTENDED RELEASE ORAL 2 TIMES DAILY
Status: ON HOLD | COMMUNITY
End: 2024-10-03

## 2024-09-30 RX ADMIN — SODIUM CHLORIDE 500 ML: 9 INJECTION, SOLUTION INTRAVENOUS at 16:43

## 2024-09-30 RX ADMIN — CEFTRIAXONE SODIUM 1 G: 1 INJECTION, POWDER, FOR SOLUTION INTRAMUSCULAR; INTRAVENOUS at 18:36

## 2024-09-30 ASSESSMENT — ACTIVITIES OF DAILY LIVING (ADL)
ADLS_ACUITY_SCORE: 40
ADLS_ACUITY_SCORE: 38
ADLS_ACUITY_SCORE: 40
ADLS_ACUITY_SCORE: 38
ADLS_ACUITY_SCORE: 40

## 2024-09-30 NOTE — ED NOTES
Bed: ED09  Expected date:   Expected time:   Means of arrival:   Comments:  HEMS 442 89F, weak, fell 1 week ago

## 2024-09-30 NOTE — ED PROVIDER NOTES
Emergency Department Note      History of Present Illness     Chief Complaint   Generalized Weakness    HPI History is limited due to baseline dementia, hearing loss and language barrier.   Deborah Zhou is a 89 year old female with a history of dementia, hypertension, type II diabetes, stage 3 CKD, UTI, and peripheral vascular disease who presents via EMS from her assisted living facility for evaluation of generalized weakness and cognitive decline. Per EMS, the patient had an unwitnessed fall one week ago and was not seen in the emergency department. States that for the past few days, she has not been able to ambulate at her baseline and has been requiring a wheelchair for transport. Adds that in addition to her baseline dementia, she has new visual hallucinations of bugs and people that are not in the room.  Daughter also notes that she has not been sleeping well recently and has not been eating well.    Independent Historian   EMS as detailed above.  Daughter, Dinorah Monsalve, as detailed above.    Review of External Notes   I reviewed paperwork brought by EMS including POLST form noting the patient's code status to be DNR/DNI.  I reviewed the nurse triage note from earlier today reviewing the patient's symptoms and the recommendation that the patient be evaluated.  I reviewed the discharge summary from 4/1/2024 when the patient was admitted for hyperglycemia and RAHUL.    Past Medical History     Medical History and Problem List   Dementia  Type II diabetes  Stage 3 CKD  Hypertension  UTI  Hyperlipidemia  Osteoporosis   Peripheral vascular disease    Medications   Novolog  Lantus  Metoprolol  Torsemide  Amlodipine  Aspirin 81 mg   Glipizide  Lispro    Surgical History   Cataract   I&D, left hip    Physical Exam     Patient Vitals for the past 24 hrs:   BP Temp Temp src Pulse Resp SpO2   09/30/24 2126 (!) 155/68 -- -- -- 20 96 %   09/30/24 1837 (!) 151/86 -- -- 81 -- --   09/30/24 1805 -- -- -- -- -- 95 %   09/30/24  1800 -- -- -- -- -- 96 %   09/30/24 1700 -- -- -- -- -- 94 %   09/30/24 1501 137/67 98.9  F (37.2  C) Oral 84 16 95 %     Physical Exam  Vitals and nursing note reviewed.   Constitutional:       General: She is not in acute distress.     Appearance: She is not ill-appearing.      Comments: +[place]   HENT:      Head: Normocephalic and atraumatic.      Right Ear: External ear normal.      Left Ear: External ear normal.      Nose: Nose normal.      Mouth/Throat:      Mouth: Mucous membranes are moist.   Eyes:      Extraocular Movements: Extraocular movements intact.      Conjunctiva/sclera: Conjunctivae normal.   Cardiovascular:      Rate and Rhythm: Normal rate and regular rhythm.      Heart sounds: No murmur heard.  Pulmonary:      Effort: Pulmonary effort is normal. No respiratory distress.      Breath sounds: Normal breath sounds. No wheezing, rhonchi or rales.   Abdominal:      General: Abdomen is flat. Bowel sounds are normal. There is no distension.      Palpations: Abdomen is soft.      Tenderness: There is no abdominal tenderness. There is no guarding or rebound.   Musculoskeletal:         General: No deformity or signs of injury.      Cervical back: Normal range of motion and neck supple.   Skin:     General: Skin is warm and dry.      Findings: No rash.   Neurological:      Mental Status: She is alert.      Sensory: No sensory deficit.      Motor: No weakness.           Diagnostics     Lab Results   Labs Ordered and Resulted from Time of ED Arrival to Time of ED Departure   ROUTINE UA WITH MICROSCOPIC REFLEX TO CULTURE - Abnormal       Result Value    Color Urine Straw      Appearance Urine Slightly Cloudy (*)     Glucose Urine 1000 (*)     Bilirubin Urine Negative      Ketones Urine Negative      Specific Gravity Urine 1.010      Blood Urine Small (*)     pH Urine 6.0      Protein Albumin Urine 100 (*)     Urobilinogen Urine Normal      Nitrite Urine Negative      Leukocyte Esterase Urine Large (*)      Bacteria Urine Few (*)     WBC Clumps Urine Present (*)     Amorphous Crystals Urine Few (*)     RBC Urine 6 (*)     WBC Urine 127 (*)    COMPREHENSIVE METABOLIC PANEL - Abnormal    Sodium 135      Potassium 5.7 (*)     Carbon Dioxide (CO2) 19 (*)     Anion Gap 10      Urea Nitrogen 36.2 (*)     Creatinine 1.43 (*)     GFR Estimate 33 (*)     Calcium 7.0 (*)     Chloride 106      Glucose 277 (*)     Alkaline Phosphatase        AST        ALT        Protein Total 5.5 (*)     Albumin 2.7 (*)     Bilirubin Total 0.2     PROCALCITONIN - Normal    Procalcitonin 0.04     CBC WITH PLATELETS AND DIFFERENTIAL    WBC Count 6.2      RBC Count 4.23      Hemoglobin 12.0      Hematocrit 36.1      MCV 85      MCH 28.4      MCHC 33.2      RDW 12.5      Platelet Count 227      % Neutrophils 68      % Lymphocytes 21      % Monocytes 8      % Eosinophils 2      % Basophils 1      % Immature Granulocytes 0      NRBCs per 100 WBC 0      Absolute Neutrophils 4.2      Absolute Lymphocytes 1.3      Absolute Monocytes 0.5      Absolute Eosinophils 0.2      Absolute Basophils 0.0      Absolute Immature Granulocytes 0.0      Absolute NRBCs 0.0     URINE CULTURE       Imaging   CT Head w/o Contrast   Final Result   IMPRESSION:   1.  No CT evidence for acute intracranial process.   2.  Brain atrophy and presumed chronic microvascular ischemic changes as above.          EKG   ECG taken at 1625, ECG read at 1653  Sinus rhythm with 1st degree AV block with occasional premature ventricular complexes  Low voltage QRS  Prolonged QT   Long QT new as compared to prior, dated 6/16/21.  Rate 100 bpm. AK interval 276 ms. QRS duration 80 ms. QT/QTc 382/493 ms. P-R-T axes 78 70 83.    Independent Interpretation   CT Head: No intracranial hemorrhage.    ED Course      Medications Administered   Medications   sodium chloride 0.9% BOLUS 500 mL (0 mLs Intravenous Stopped 9/30/24 1839)   cefTRIAXone (ROCEPHIN) 1 g vial to attach to  mL bag for ADULTS or NS  50 mL bag for PEDS (0 g Intravenous Stopped 9/30/24 1906)       Procedures   Procedures     Discussion of Management   Admitting Hospitalist, Dr. Keane    ED Course   ED Course as of 09/30/24 2244   Mon Sep 30, 2024   1608 I obtained history and examined the patient as noted above.    1614 I spoke with Dinorah Monsalve the patient's daughter as listed in the chart.    1840 I left a message for the patient's daughter over the phone.    1840 I rechecked and updated the patient.    1844 I spoke with Dr. Keane of the hospitalist service regarding admission.        Additional Documentation  None    Medical Decision Making / Diagnosis     CMS Diagnoses: The patient has signs of sepsis   Sepsis ED evaluation   The patient has signs of sepsis as evidenced by:  1. Presence of 2 SIRS criteria, suspected infection, AND  2. Organ dysfunction: Acute encephalopathy due to infection    Time zero:  1712  on 09/30/24 as this was the time when  UA resulted, raising suspicion for bacterial infection.    Note: Due to a national blood culture bottle shortage, reduced blood cultures may have been drawn on this patient.    Lactic Acid Results:  Recent Labs   Lab Test 03/30/24  0102 03/29/24  2118 03/29/24  1747   LACT 2.0 2.4* 3.0*       3 Hour Bundle 6 Hour Bundle (Reassessment)   Blood Cultures before IV Antibiotics: No, antibiotics were started prior to BCx collection b/c waiting for BCx to be collected would have been detrimental to the patient  Antibiotics given: see below  Prehospital fluid volume (mL):                     Total fluids given (ED +Pre-hospital):  The patient responded to a lesser volume of IV fluids. The initial volume ordered was 500 mL.    Repeat Lactic Acid Level: Ordered by reflex for 2 hours after initial lactic acid collection.  Vasopressors: MAP>65 after initial IVF bolus, will continue to monitor fluid status and vital signs.  Repeat perfusion exam: I attest to having performed a repeat sepsis exam and  assessment of perfusion at  1755 .   BMI Readings from Last 1 Encounters:   04/25/24 23.43 kg/m        Anti-infectives (From admission through now)      Start     Dose/Rate Route Frequency Ordered Stop    09/30/24 1755  cefTRIAXone (ROCEPHIN) 1 g vial to attach to  mL bag for ADULTS or NS 50 mL bag for PEDS         1 g  over 15-30 Minutes Intravenous ONCE 09/30/24 1751 09/30/24 1906                MIPS       None    [facility]   Deborah Zhou is a 89 year old female who presents with apparent delirium according to the patient's daughter.  She has been having increased hallucinations, confusion, has not been sleeping or eating normally recently.  It is difficult to obtain any reliable history from the patient given combination of hearing loss, visual loss, language barrier, and underlying dementia.  History is based on daughter's report and documentation in the chart.  Workup was pursued as noted with negative head CT.  Lab work was fairly unremarkable as well other than mild RAHUL and findings of a UTI.  Possible that patient could have an infectious encephalopathy from a UTI.  She was given a dose of Rocephin and will plan to admit.  She does not appear septic at this time as she has normal vital signs, no leukocytosis and normal procalcitonin.  I discussed with the hospitalist who accepts admission.    Disposition   The patient was admitted to the hospital.     Diagnosis     ICD-10-CM    1. Delirium  R41.0       2. Acute UTI  N39.0            Scribe Disclosure:  I, Gail Laurent, am serving as a scribe at 4:16 PM on 9/30/2024 to document services personally performed by Justus Mckeon MD based on my observations and the provider's statements to me.        Justus Mckeon MD  09/30/24 6035

## 2024-09-30 NOTE — LETTER
Hi there- Please see attached orders for Deborah, these are NOT Signed as she is not discharging until tomorrow. I will send new orders tomorrow when they are signed. Again we have transport between [id number].     Thanks!

## 2024-09-30 NOTE — ED TRIAGE NOTES
"Pt comes from assisted living where staff report she has been \"declining\" for 2-3 days, normally ambulates, now needing to use a wheelchair. Pt has dementia and is at her cognitive baseline, however staff report she has been hallucinating, seeing bugs on her and people in the room that aren't there. Staff also state she had an unwitnessed fall one week ago that she was not seen for.         "

## 2024-10-01 ENCOUNTER — APPOINTMENT (OUTPATIENT)
Dept: OCCUPATIONAL THERAPY | Facility: CLINIC | Age: 89
DRG: 689 | End: 2024-10-01
Attending: HOSPITALIST
Payer: COMMERCIAL

## 2024-10-01 LAB
ANION GAP SERPL CALCULATED.3IONS-SCNC: 12 MMOL/L (ref 7–15)
ATRIAL RATE - MUSE: 100 BPM
BASOPHILS # BLD AUTO: 0 10E3/UL (ref 0–0.2)
BASOPHILS NFR BLD AUTO: 0 %
BUN SERPL-MCNC: 35.9 MG/DL (ref 8–23)
CALCIUM SERPL-MCNC: 9.2 MG/DL (ref 8.8–10.4)
CHLORIDE SERPL-SCNC: 106 MMOL/L (ref 98–107)
CREAT SERPL-MCNC: 1.39 MG/DL (ref 0.51–0.95)
DIASTOLIC BLOOD PRESSURE - MUSE: NORMAL MMHG
EGFRCR SERPLBLD CKD-EPI 2021: 34 ML/MIN/1.73M2
EOSINOPHIL # BLD AUTO: 0.2 10E3/UL (ref 0–0.7)
EOSINOPHIL NFR BLD AUTO: 3 %
ERYTHROCYTE [DISTWIDTH] IN BLOOD BY AUTOMATED COUNT: 12.3 % (ref 10–15)
GLUCOSE BLDC GLUCOMTR-MCNC: 132 MG/DL (ref 70–99)
GLUCOSE BLDC GLUCOMTR-MCNC: 160 MG/DL (ref 70–99)
GLUCOSE BLDC GLUCOMTR-MCNC: 176 MG/DL (ref 70–99)
GLUCOSE BLDC GLUCOMTR-MCNC: 191 MG/DL (ref 70–99)
GLUCOSE BLDC GLUCOMTR-MCNC: 260 MG/DL (ref 70–99)
GLUCOSE BLDC GLUCOMTR-MCNC: 87 MG/DL (ref 70–99)
GLUCOSE SERPL-MCNC: 184 MG/DL (ref 70–99)
HCO3 SERPL-SCNC: 22 MMOL/L (ref 22–29)
HCT VFR BLD AUTO: 38.1 % (ref 35–47)
HGB BLD-MCNC: 12.9 G/DL (ref 11.7–15.7)
IMM GRANULOCYTES # BLD: 0 10E3/UL
IMM GRANULOCYTES NFR BLD: 1 %
INTERPRETATION ECG - MUSE: NORMAL
LYMPHOCYTES # BLD AUTO: 1.9 10E3/UL (ref 0.8–5.3)
LYMPHOCYTES NFR BLD AUTO: 22 %
MCH RBC QN AUTO: 28.3 PG (ref 26.5–33)
MCHC RBC AUTO-ENTMCNC: 33.9 G/DL (ref 31.5–36.5)
MCV RBC AUTO: 84 FL (ref 78–100)
MONOCYTES # BLD AUTO: 0.6 10E3/UL (ref 0–1.3)
MONOCYTES NFR BLD AUTO: 7 %
NEUTROPHILS # BLD AUTO: 5.8 10E3/UL (ref 1.6–8.3)
NEUTROPHILS NFR BLD AUTO: 67 %
NRBC # BLD AUTO: 0 10E3/UL
NRBC BLD AUTO-RTO: 0 /100
P AXIS - MUSE: 78 DEGREES
PLATELET # BLD AUTO: 258 10E3/UL (ref 150–450)
POTASSIUM SERPL-SCNC: 4.5 MMOL/L (ref 3.4–5.3)
PR INTERVAL - MUSE: 276 MS
QRS DURATION - MUSE: 80 MS
QT - MUSE: 382 MS
QTC - MUSE: 492 MS
R AXIS - MUSE: 70 DEGREES
RBC # BLD AUTO: 4.56 10E6/UL (ref 3.8–5.2)
SODIUM SERPL-SCNC: 140 MMOL/L (ref 135–145)
SYSTOLIC BLOOD PRESSURE - MUSE: NORMAL MMHG
T AXIS - MUSE: 83 DEGREES
VENTRICULAR RATE- MUSE: 100 BPM
WBC # BLD AUTO: 8.5 10E3/UL (ref 4–11)

## 2024-10-01 PROCEDURE — 999N000147 HC STATISTIC PT IP EVAL DEFER

## 2024-10-01 PROCEDURE — 250N000013 HC RX MED GY IP 250 OP 250 PS 637: Performed by: HOSPITALIST

## 2024-10-01 PROCEDURE — 250N000011 HC RX IP 250 OP 636: Performed by: HOSPITALIST

## 2024-10-01 PROCEDURE — 97535 SELF CARE MNGMENT TRAINING: CPT | Mod: GO | Performed by: OCCUPATIONAL THERAPIST

## 2024-10-01 PROCEDURE — 80048 BASIC METABOLIC PNL TOTAL CA: CPT | Performed by: HOSPITALIST

## 2024-10-01 PROCEDURE — 36415 COLL VENOUS BLD VENIPUNCTURE: CPT | Performed by: HOSPITALIST

## 2024-10-01 PROCEDURE — 99232 SBSQ HOSP IP/OBS MODERATE 35: CPT | Performed by: HOSPITALIST

## 2024-10-01 PROCEDURE — 82962 GLUCOSE BLOOD TEST: CPT

## 2024-10-01 PROCEDURE — 85025 COMPLETE CBC W/AUTO DIFF WBC: CPT | Performed by: HOSPITALIST

## 2024-10-01 PROCEDURE — 97165 OT EVAL LOW COMPLEX 30 MIN: CPT | Mod: GO | Performed by: OCCUPATIONAL THERAPIST

## 2024-10-01 PROCEDURE — 258N000003 HC RX IP 258 OP 636: Performed by: HOSPITALIST

## 2024-10-01 PROCEDURE — 120N000001 HC R&B MED SURG/OB

## 2024-10-01 RX ORDER — METOPROLOL SUCCINATE 50 MG/1
100 TABLET, EXTENDED RELEASE ORAL DAILY
Status: DISCONTINUED | OUTPATIENT
Start: 2024-10-01 | End: 2024-10-04 | Stop reason: HOSPADM

## 2024-10-01 RX ORDER — ASPIRIN 81 MG/1
81 TABLET ORAL DAILY
Status: DISCONTINUED | OUTPATIENT
Start: 2024-10-01 | End: 2024-10-04 | Stop reason: HOSPADM

## 2024-10-01 RX ORDER — AMLODIPINE BESYLATE 5 MG/1
5 TABLET ORAL DAILY
Status: DISCONTINUED | OUTPATIENT
Start: 2024-10-01 | End: 2024-10-04 | Stop reason: HOSPADM

## 2024-10-01 RX ADMIN — SODIUM CHLORIDE: 9 INJECTION, SOLUTION INTRAVENOUS at 12:35

## 2024-10-01 RX ADMIN — CALCIUM GLUCONATE 1 G: 98 INJECTION, SOLUTION INTRAVENOUS at 00:07

## 2024-10-01 RX ADMIN — METOPROLOL SUCCINATE 100 MG: 50 TABLET, EXTENDED RELEASE ORAL at 10:47

## 2024-10-01 RX ADMIN — HEPARIN SODIUM 5000 UNITS: 5000 INJECTION, SOLUTION INTRAVENOUS; SUBCUTANEOUS at 00:14

## 2024-10-01 RX ADMIN — AMLODIPINE BESYLATE 5 MG: 5 TABLET ORAL at 10:47

## 2024-10-01 RX ADMIN — HEPARIN SODIUM 5000 UNITS: 5000 INJECTION, SOLUTION INTRAVENOUS; SUBCUTANEOUS at 09:07

## 2024-10-01 RX ADMIN — ASPIRIN 81 MG: 81 TABLET, COATED ORAL at 10:47

## 2024-10-01 RX ADMIN — SENNOSIDES AND DOCUSATE SODIUM 1 TABLET: 50; 8.6 TABLET ORAL at 09:07

## 2024-10-01 RX ADMIN — HEPARIN SODIUM 5000 UNITS: 5000 INJECTION, SOLUTION INTRAVENOUS; SUBCUTANEOUS at 17:28

## 2024-10-01 RX ADMIN — SENNOSIDES AND DOCUSATE SODIUM 1 TABLET: 50; 8.6 TABLET ORAL at 20:58

## 2024-10-01 RX ADMIN — CEFTRIAXONE SODIUM 1 G: 1 INJECTION, POWDER, FOR SOLUTION INTRAMUSCULAR; INTRAVENOUS at 17:30

## 2024-10-01 RX ADMIN — SODIUM CHLORIDE: 9 INJECTION, SOLUTION INTRAVENOUS at 00:11

## 2024-10-01 ASSESSMENT — ACTIVITIES OF DAILY LIVING (ADL)
ADLS_ACUITY_SCORE: 40
ADLS_ACUITY_SCORE: 40
ADLS_ACUITY_SCORE: 46
ADLS_ACUITY_SCORE: 40
ADLS_ACUITY_SCORE: 46
ADLS_ACUITY_SCORE: 41
ADLS_ACUITY_SCORE: 46
ADLS_ACUITY_SCORE: 45
ADLS_ACUITY_SCORE: 46
ADLS_ACUITY_SCORE: 41
ADLS_ACUITY_SCORE: 46
ADLS_ACUITY_SCORE: 41
ADLS_ACUITY_SCORE: 46
ADLS_ACUITY_SCORE: 41

## 2024-10-01 NOTE — H&P
[facility]  History and Physical   Hospitalist  Deon Keane MD       Deborah Zhou MRN# [id number]   YOB: 1924 Age: 89 year old      Date of Admission:  9/30/2024         Assessment and Plan:   Deborah Zhou is a 89 year old female extremely hard of hearing with PMH significant for dementia, diabetes, hypertension, chronic edema, CKD stage III was brought to ED from her assisted living facility for hallucinations, noted with likely UTI, admitted inpatient 9/30/24.    Likely delirium/hallucinations secondary to UTI  mild hyperkalemia  acute renal failure on CKD stage III  mild hypocalcemia  Dementia  extremely hard of hearing  - she resides in assisted-living facility where per family she was noted to be hallucinating, seeing people and bugs; has been having poor PO intake but with good appetite when family brings food  -initial workup notable for potassium 5.7, creatinine 1.43 (baseline around 1.2), calcium 7, normal pro calcitonin, normal WBC  -UA grossly abnormal; culture pending  -CT head showed nothing acute    -Will admit as inpatient; likely delirium secondary to UTI in the setting of dementia and hard of hearing  -initiated on Rocephin in ED, will continue pending cultures  -will start NS at 75 ML per hour while holding PTA torsemide  -will supplement calcium with calcium gluconate IV  -will have delirium and fall precautions in place  -monitor BMP  -will get PT/OT and  for disposition planning    Prolonged QTC  -EKG noted low voltage with , occasional PVCs  -supplementing calcium as above  -will avoid QTC prolonging medications    Diabetes mellitus  -will resume PTA NovoLog and Lantus when verified by pharmacy  -high resistance sliding scale insulin with hypoglycemia protocol    Hypertension  chronic lower extremity edema  -will resume PTA amlodipine and Toprol-XL when verified by pharmacy  -will hold off on PTA Demadex given poor PO  intake  -clinically does not look significantly fluid overloaded; monitor volume status closely  -hydralazine IV PRN for SBP> 180    Clinically Significant Risk Factors Present on Admission        # Hyperkalemia: Highest K = 5.7 mmol/L in last 2 days, will monitor as appropriate         # Hypoalbuminemia: Lowest albumin = 2.7 g/dL at 9/30/2024  4:04 PM, will monitor as appropriate     # Drug Induced Platelet Defect: home medication list includes an antiplatelet medication       # Hypertension: Noted on problem list                   # Financial/Environmental Concerns:                  DVT prophylaxis: subcutaneous heparin  Code status: DNR/DNI (has a POLST; also discussed with her daughter)    Care plan discussed with ED provider, patient and her daughter present in room           Primary Care Physician:   Benita Hudson [phone]         Chief Complaint:     Hallucinations    History is limited from the patient due to her dementia and hard of hearing status; history reviewed from her daughter         History of Present Illness:     Deborah Zhou is a 89 year old female extremely hard of hearing with PMH significant for dementia, diabetes, hypertension, chronic edema, CKD stage III was brought to ED from her assisted living facility for hallucinations, noted with likely UTI, admitted inpatient 9/30/24.    She resides in assisted-living facility where per family she was noted to be hallucinating, seeing people and bugs. She also has been having poor PO intake but with good appetite one family brings forward.    Patient herself denies any complaints.  -initial workup notable for potassium 5.7, creatinine 1.43 (baseline around 1.2), calcium 7, normal pro calcitonin, normal WBC  -UA grossly abnormal; culture pending  -CT head showed nothing acute  -initiated on Rocephin in ED and hospitalist was requested admission for further evaluation.    The patient denies any fever, chills, rigors, chest pain or  shortness of breath.  Denies pain abdomen.  No bowel or bladder disturbances.           Past Medical History:     Dementia  Diabetes  Hypertension  chronic edema  CKD stage III   extremely hard of hearing          Past Surgical History:   No past surgical history on file.           Home Medications:     Prior to Admission Medications   Prescriptions Last Dose Informant Patient Reported? Taking?   ORDER FOR DME   No No   Sig: Equipment being ordered: post op shoe   acetaminophen (TYLENOL) 500 MG tablet   Yes No   Sig: Take 1,000 mg by mouth every 6 hours as needed for mild pain   amLODIPine (NORVASC) 5 MG tablet   Yes No   Sig: Take 5 mg by mouth daily   aspirin 81 MG EC tablet   Yes No   Sig: Take 81 mg by mouth daily   glucose (BD GLUCOSE) 4 g chewable tablet   No No   Sig: Take 1 tablet by mouth every hour as needed for low blood sugar   insulin aspart (NOVOLOG PEN) 100 UNIT/ML pen   No No   Sig: Inject 4 Units Subcutaneous 3 times daily (with meals)   insulin aspart (NOVOLOG PEN) 100 UNIT/ML pen   No No   Sig: Inject 1-7 Units Subcutaneous 3 times daily (before meals)   insulin glargine (LANTUS PEN) 100 UNIT/ML pen   No No   Sig: Inject 8 Units Subcutaneous at bedtime   insulin pen needle (32G X 4 MM) 32G X 4 MM miscellaneous   No No   Sig: Use 7 pen needles daily or as directed.   metoprolol succinate ER (TOPROL XL) 50 MG 24 hr tablet   No No   Sig: Take 1 tablet (50 mg) by mouth daily   torsemide (DEMADEX) 10 MG tablet   No No   Sig: Take 1 tablet (10 mg) by mouth every 48 hours   vitamin D3 (CHOLECALCIFEROL) 50 mcg (2000 units) tablet   Yes No   Sig: Take 1 tablet by mouth daily      Facility-Administered Medications: None            Allergies:     Allergies   Allergen Reactions    Gabapentin             Social History:   Deborah Zhou  reports that she has never smoked. She has never used smokeless tobacco.              Family History:   Deborah Zhou family history is not on file.    Family history was reviewed  by myself and not pertinent to current presentation.           Review of Systems:   A10 point Review of Systems was done and were negative other than noted in the HPI.             Physical Exam:   Blood pressure (!) 151/86, pulse 81, temperature 98.9  F (37.2  C), temperature source Oral, resp. rate 16, SpO2 95%, not currently breastfeeding.  0 lbs 0 oz        Constitutional: Alert, awake and oriented to self only; lying comfortably in bed in no apparent distress; extremely hard of hearing       Oral cavity: Moist mucosa   Cardiovascular: Normal s1 s2, regular rate and rhythm, no murmur   Lungs: B/l clear to auscultation, no wheezes or crepitations   Abdomen: Soft, nt, nd, no guarding, rigidity or rebound; BS +   LE : Trace b/l edema +   Musculoskeletal: Power 5/5 in all extremities   Neuro: No focal neurological deficits noted, CN II to XII grossly intact   Psychiatry: normal mood and affect  Skin: No obvious skin rashes or ulcers             Data:   All new lab and imaging data was reviewed in Epic.   Significant labs and imagings include:    CMP notable for potassium 5.7, BUN 36, creatinine 1.43  LFTs pending  blood glucose 277  normal pro calcitonin  CBC unremarkable  UA grossly abnormal, urine culture pending  EKG low voltage, , sinus with occasional PVCs  CT head showed nothing acute             Deon Keane MD  Hospitalist

## 2024-10-01 NOTE — PROGRESS NOTES
[facility]    Medicine Progress Note - Hospitalist Service    Date of Admission:  9/30/2024    Assessment & Plan   Deborah Zhou is a 89 year old female extremely hard of hearing with PMH significant for dementia, diabetes, hypertension, chronic edema, CKD stage IIIb was brought to ED from her assisted living facility for hallucinations, noted with likely UTI, admitted inpatient 9/30/24.     Suspected hallucinations and acute metabolic encephalopathy secondary to UTI   UTI  Hyperkalemia, resolved  Hypocalcemia, resolved  Chronic kidney disease, stage IIIb  Dementia  Hearing loss  Patient resides in an assisted-living memory care facility.  Family noted she was hallucinating, seeing people and bugs; had been having poor PO intake but with good appetite one family brings forward.  She was brought to the ED for evaluation.  Initial workup notable for potassium 5.7, creatinine 1.43 (baseline around 1.2), calcium 7, normal pro calcitonin, normal WBC.  UA grossly abnormal.  CT head showed no acute intracranial abnormalities.  Admitted to inpatient.  Started on Rocephin in the ED, continue the same while awaiting urine culture results.  Urine culture pending, follow-up on results.  Continue IV fluids for now.  Calcium and potassium within normal limits on 10/1/2024.  Delirium and fall precautions in place.  PT/OT consulted, appears that she is wheelchair-bound at baseline but does stand to pivot and transfer.  Care transitions consulted regarding discharge planning.  Recheck labs in AM.    Prolonged QTc  EKG in the ED on 9/30/2024 showed low voltage with , occasional PVCs.  Calcium replaced as noted above.  Avoid QTc prolonging medications     Diabetes mellitus  Hemoglobin A1c 12.2% on 3/29/2024.  Hold PTA glipizide.  Monitor blood sugars and use high-dose sliding scale NovoLog as indicated.  Currently on regular diet, will transition to mod carb diet if blood sugars elevated.  Monitor for  hypoglycemia.     Hypertension  Chronic lower extremity edema  Continue PTA amlodipine and Toprol-XL with hold parameters.  As needed IV hydralazine available for significantly elevated blood pressures.  Monitor volume status closely while getting IV fluids.  Hold PTA Demadex for now while getting IV fluids.        Diet: Combination Diet Regular Diet Adult    DVT Prophylaxis: Heparin SQ  Ghotra Catheter: Not present  Lines: None     Cardiac Monitoring: None  Code Status: No CPR- Do NOT Intubate      Clinically Significant Risk Factors Present on Admission        # Hyperkalemia: Highest K = 5.7 mmol/L in last 2 days, will monitor as appropriate       # Hypoalbuminemia: Lowest albumin = 2.7 g/dL at 9/30/2024  4:04 PM, will monitor as appropriate   # Drug Induced Platelet Defect: home medication list includes an antiplatelet medication   # Hypertension: Noted on problem list             # Financial/Environmental Concerns:           Disposition Plan     Medically Ready for Discharge: Anticipated in 2-4 Days             Stefan Bailey MD  Hospitalist Service  [facility]  Securely message with CONEXANCE MD (more info)  Text page via [employer] Paging/Directory   ______________________________________________________________________    Interval History   Deborah Zhou was seen this morning.  Difficult to communicate due to language barrier and patient being extremely hard of talking.  [place] speaking staff saw patient this morning and used the pocket talker, but still unable to get consistent reliable history.  Appears to still be hallucinating, talked about seeing multiple people in the room that were not really there.  She otherwise seems to be feeling okay.  Ate breakfast this morning.  Seems to deny pain and nausea.    Physical Exam   Vital Signs: Temp: 98.6  F (37  C) Temp src: Axillary BP: (!) 146/69 Pulse: 83   Resp: 20 SpO2: 96 % O2 Device: None (Room air)    Weight: 0 lbs 0  oz    Constitutional: awake, alert, cooperative, no apparent distress, laying in the hospital bed with head of bed elevated  Respiratory: no increased work of breathing, clear to auscultation bilaterally, no crackles or wheezing  Cardiovascular: regular rate and rhythm, normal S1 and S2, no murmur noted  GI: normal bowel sounds, soft, non-distended, non-tender  Skin: warm, dry  Musculoskeletal: no lower extremity pitting edema present  Neurologic: awake, alert, difficult to assess orientation, moves all extremities spontaneously, still seems to be hallucinating at times    Medical Decision Making       45 MINUTES SPENT BY ME on the date of service doing chart review, history, exam, documentation & further activities per the note.      Data     I have personally reviewed the following data over the past 24 hrs:    8.5  \   12.9   / 258     140 106 35.9 (H) /  191 (H)   4.5 22 1.39 (H) \     ALT: N/A AST: N/A AP: N/A TBILI: 0.2   ALB: 2.7 (L) TOT PROTEIN: 5.5 (L) LIPASE: N/A     Procal: 0.04 CRP: N/A Lactic Acid: N/A         Imaging results reviewed over the past 24 hrs:   Recent Results (from the past 24 hour(s))   CT Head w/o Contrast    Narrative    EXAM: CT HEAD W/O CONTRAST  LOCATION: [facility]  DATE: 9/30/2024    INDICATION: ams  COMPARISON: 7/13/2021  TECHNIQUE: Routine CT Head without IV contrast. Multiplanar reformats. Dose reduction techniques were used.    FINDINGS:  INTRACRANIAL CONTENTS: No intracranial hemorrhage, extraaxial collection, or mass effect.  No CT evidence of acute infarct. Moderate presumed chronic small vessel ischemic changes. There is intracranial atherosclerosis. Mild to moderate generalized   volume loss. No hydrocephalus.     VISUALIZED ORBITS/SINUSES/MASTOIDS: Prior bilateral cataract surgery. Visualized portions of the orbits are otherwise unremarkable. No paranasal sinus mucosal disease. No middle ear or mastoid effusion.    BONES/SOFT TISSUES: No acute  abnormality.      Impression    IMPRESSION:  1.  No CT evidence for acute intracranial process.  2.  Brain atrophy and presumed chronic microvascular ischemic changes as above.

## 2024-10-01 NOTE — CONSULTS
Care Management Initial Consult    General Information  Assessment completed with: Care Team Member, Danny Celina  Type of CM/SW Visit: Initial Assessment    Primary Care Provider verified and updated as needed: Yes   Readmission within the last 30 days: no previous admission in last 30 days      Reason for Consult: discharge planning  Advance Care Planning:            Communication Assessment  Patient's communication style: spoken language (non-English)             Cognitive  Cognitive/Neuro/Behavioral: .WDL except  Level of Consciousness: alert  Arousal Level: opens eyes spontaneously  Orientation: disoriented to, place, time, situation  Mood/Behavior: restless     Speech:  (CORA)    Living Environment:   People in home: facility resident     Current living Arrangements: assisted living  Name of Facility: [facility]   Able to return to prior arrangements: yes       Family/Social Support:  Care provided by: self, other (see comments) (faciltiy staff)  Provides care for: no one  Marital Status: Single  Support system: Children          Description of Support System: Supportive, Involved    Support Assessment: Adequate family and caregiver support, Adequate social supports    Current Resources:   Patient receiving home care services: No        Community Resources: County Worker, Transportation Services  Equipment currently used at home:    Supplies currently used at home:      Employment/Financial:  Employment Status: retired        Financial Concerns: none           Does the patient's insurance plan have a 3 day qualifying hospital stay waiver?  No    Lifestyle & Psychosocial Needs:  Social Determinants of Health     Food Insecurity: Not on file   Depression: Not at risk (3/29/2024)    Received from [email or web address]    PHQ-2     PHQ-2 Score: 0   Housing Stability: Not on file   Tobacco Use: Low Risk  (6/26/2024)    Received from [facility]    Patient History     Smoking Tobacco Use: Never  "    Smokeless Tobacco Use: Never     Passive Exposure: Never   Financial Resource Strain: Not on file   Alcohol Use: Not on file   Transportation Needs: Not on file   Physical Activity: Not on file   Interpersonal Safety: Not on file   Stress: Not on file   Social Connections: Not on file   Health Literacy: Not on file       Functional Status:  Prior to admission patient needed assistance:              Mental Health Status:          Chemical Dependency Status:                Values/Beliefs:  Spiritual, Cultural Beliefs, Mormon Practices, Values that affect care:                 Discussed  Partnership in Safe Discharge Planning  document with patient/family: No    Additional Information:  Writer received consult for discharge planning. Per H&P, patient  is a 89 year old female extremely hard of hearing with PMH significant for dementia, diabetes, hypertension, chronic edema, CKD stage III was brought to ED from her assisted living facility for hallucinations, noted with likely UTI, admitted inpatient 9/30/24.     Writer reviewed notes and patient lives at [facility]. CM team called [facility], phone [phone] and requested to speak with staff who normally care for pt, to request background information on pt's baseline and place of living.  Spoke with Francis (Director of facility) who provided the following information:     In what kind of facility does pt reside?  Facility Type: senior living (assisted living) - \"home discharge\"    What do you know about pt's baseline cognition and mobility? Normally Assist of 1. The last 2 weeks patient has been more confused and hallucinating    What level of cognition/mobility is required for safe return? Can return if she is assist of 2    Best number to reach facility nursing? Phone [phone]  Fax:  [phone]   Evening/weekend number? Same as above   What does the facility need faxed to them from us at discharge?  Orders   Does facility manage medications?   Yes If " yes, contracted pharmacy? Name:  [facility]   If new Rx meds at discharge, fill at hospital pharmacy or contracted pharmacy?   [facility]   What is the preferred return time for the resident?  Anytime during the day, prefer no weekend admissions  Do you know how they typically transport? Will need transport set up.     SEFERINO called daughter Dinorah. Dinorah states that she is worried about her moms confusion and hallucinations, she states that she is hopeful that this is reversible. Dinorah states that she is thinking that patient would be able to return to [facility] and is hopeful that she is getting proper support even with these changes. Dinorah would like to be kept updated on discharge planning. Patient would need transport set up as per Dinorah it is not safe for her to transfer patient at this time. SEFERINO went over possible out of pocket costs and Dinorah is not concerned about that at this time.         Next Steps: Discharge back to [facility] if appropriate at time of discharge. Pending PT/OT to see and give recommendations.     RIKA Alberto

## 2024-10-01 NOTE — PROGRESS NOTES
A&O to self only, restless, tearful at times; hx of dementia from memory care.  Hallucinating, waving at and communicating with nobody in the room.  VSS on RA.  Denies pain. Moves self well in bed. Skin intact.  PIV x2 with NS infusing @ 75 mL/hr. Attempts to utilize  services for Sami speaking patient not successful, pt very [place] and did not appear to be able to communicate with . Purewick in place.

## 2024-10-01 NOTE — PLAN OF CARE
Physical Therapy: Orders received. Chart reviewed and discussed with care team.? Occupational therapy screening for IP PT needs.  Patient appears to be at mobility baseline and plans to return to [facility] with staff assisting.  Defer to OT recommendations for discharge, will complete PT orders.

## 2024-10-01 NOTE — PROGRESS NOTES
10/01/24 1100   Appointment Info   Signing Clinician's Name / Credentials (OT) Claudia Hoda, OTR/L   Living Environment   People in Home facility resident   Current Living Arrangements assisted living   Home Accessibility no concerns   Living Environment Comments Per discussion with daughter, pt lives in an JESSY. They have a walk in shower with shower chair. standard toilets.   Self-Care   Usual Activity Tolerance moderate   Current Activity Tolerance fair   Equipment Currently Used at Home walker, rolling   Fall history within last six months yes   Number of times patient has fallen within last six months 1   Activity/Exercise/Self-Care Comment Pt daughter reports pt has SBA with all mobility due to fall concern, assist with dressing, bathing and toileting at baseline. Uses 4WW mainly, w/c for appointments and longer distances   Instrumental Activities of Daily Living (IADL)   IADL Comments Facility and family complete all IADLS for pt   General Information   Onset of Illness/Injury or Date of Surgery 09/30/24   Referring Physician Deon Keane MD   Patient/Family Therapy Goal Statement (OT) Pt did not state this date, family reports they would like her hallucinations to resolve and return to [facility]   Additional Occupational Profile Info/Pertinent History of Current Problem 89 year old female extremely hard of hearing with PMH significant for dementia, diabetes, hypertension, chronic edema, CKD stage IIIb was brought to ED from her assisted living facility for hallucinations, noted with likely UTI, admitted inpatient 9/30/24.   Existing Precautions/Restrictions fall   Limitations/Impairments safety/cognitive;hearing   Cognitive Status Examination   Orientation Status person   Affect/Mental Status (Cognitive) confused   Follows Commands WFL   Memory Deficit severe deficit   Cognitive Status Comments PT has known dementia at baseline. Is pleasantly confused, easily redirectable. Pt has assist with all  mobility, ADLsand cognitive tasks at baseline   Visual Perception   Impact of Vision Impairment on Function (Vision) Challenging to assess given pt cognitive decline, appears WFL   Sensory   Sensory Comments Pt reports intact, difficult to establish if she understood the question   Pain Assessment   Patient Currently in Pain No   Posture   Posture forward head position;protracted shoulders   Range of Motion Comprehensive   Comment, General Range of Motion WFL   Strength Comprehensive (MMT)   Comment, General Manual Muscle Testing (MMT) Assessment Decline in tolerance for activity   Bed Mobility   Comment (Bed Mobility) Min assist   Transfers   Transfer Comments CGA   Balance   Balance Comments Impaired in standing   Activities of Daily Living   BADL Assessment/Intervention grooming;toileting   Grooming Assessment/Training   Comment, (Grooming) Decline in tolerance for task   Toileting   Comment, (Toileting) Min assist   Clinical Impression   Criteria for Skilled Therapeutic Interventions Met (OT) Yes, treatment indicated   OT Diagnosis Decline in ADL independence and safety   Influenced by the following impairments UTI   OT Problem List-Impairments impacting ADL problems related to;cognition;strength;balance;activity tolerance impaired   Assessment of Occupational Performance 1-3 Performance Deficits   Identified Performance Deficits Impaired tolerance for activity   Planned Therapy Interventions (OT) ADL retraining;progressive activity/exercise   Clinical Decision Making Complexity (OT) problem focused assessment/low complexity   Risk & Benefits of therapy have been explained evaluation/treatment results reviewed;care plan/treatment goals reviewed;risks/benefits reviewed;current/potential barriers reviewed;participants voiced agreement with care plan;patient;participants included   OT Total Evaluation Time   OT Eval, Low Complexity Minutes (26547) 8   OT Goals   Therapy Frequency (OT) One time eval and treatment    OT Predicted Duration/Target Date for Goal Attainment 10/01/24   OT Goals Toilet Transfer/Toileting;OT Goal 1   OT: Toilet Transfer/Toileting Supervision/stand-by assist;toilet transfer;using adaptive equipment;Goal Met   OT: Goal 1 Patient will tolerate 7+ minutes of active standing with SBA to progress activity tolerance needed for ADL independence.  (Goal Met)   Self-Care/Home Management   Self-Care/Home Mgmt/ADL, Compensatory, Meal Prep Minutes (93102) 27   Symptoms Noted During/After Treatment (Meal Preparation/Planning Training) fatigue   Treatment Detail/Skilled Intervention OT;   OT Discharge Planning   OT Plan DC   OT Discharge Recommendation (DC Rec) home with assist   OT Rationale for DC Rec Patient appears to be at her baseline with mobility and ADLS after therapy session. Recommend continued SBA at [facility] at discharge, daughter reports the facility is able to provide this level of assist. No further therapy required at this time.   OT Brief overview of current status CGA sit to stand. Goals of therapy will be to address safe mobility and make recs for d/c to next level of care. Pt and RN will continue to follow all falls risk precautions as documented by RN staff while hospitalized.   Total Session Time   Timed Code Treatment Minutes 27   Total Session Time (sum of timed and untimed services) 35

## 2024-10-01 NOTE — PLAN OF CARE
Orientation: alert to self, [place], Sinhala speaking. Interrupter services difficulty using d/t hearing and dementia  Aggression Stop Light: green  Activity: A1 gaitbelt and walker  Diet/BS Checks: regular, BG covered as needed. Encouraged fluids  Tele:  n/a  IV Access/Drains: PIV SL w/ intermittent antibiotics  Pain Management: denies  Abnormal VS/Results: VSS on RA  Bowel/Bladder: incontinent at times, does not like using purewick  Skin/Wounds: scattered bruising  Consults: PT/OT  D/C Disposition: pending  Other Info:

## 2024-10-01 NOTE — PLAN OF CARE
Goal Outcome Evaluation:      Plan of Care Reviewed With: child          Outcome Evaluation: Plan to discharge back to shelter when medically stable    RIKA Alberto

## 2024-10-01 NOTE — PLAN OF CARE
RECEIVING UNIT ED HANDOFF REVIEW    ED Nurse Handoff Report was reviewed by: Zarina Fleming RN on October 1, 2024 at 12:41 AM

## 2024-10-01 NOTE — PLAN OF CARE
[id number]  A&O to self only, pleasant and cooperative; hx of dementia from memory care.  Hallucinating, waving at and communicating with nobody in the room.  VSS RA.  Denies pain. Moves self well in bed. Skin intact.  PIV x2 with NS infusing @ 75 mL/hr.

## 2024-10-01 NOTE — PHARMACY-ADMISSION MEDICATION HISTORY
Pharmacist Admission Medication History    Admission medication history is complete. The information provided in this note is only as accurate as the sources available at the time of the update.    Information Source(s): Family member, Facility (TCU/NH/) medication list/MAR, and CareEverywhere/SureScripts via in-person    Pertinent Information: Resident of [facility] Phone [phone]. Per daughter pt was taken off insulin by primary care provider. Called [facility] and left  as unable to verify when patient last took Torsemide with every other day dosing.     Changes made to PTA medication list:  Added: Glipizide  Deleted: Insulin  Changed: Metoprolol 50 mg daily --> 100 mg daily    Allergies reviewed with patient and updates made in EHR: unable to assess    Medication History Completed By: Xiao Castro RPH 9/30/2024 7:37 PM    PTA Med List   Medication Sig Last Dose    acetaminophen (TYLENOL) 500 MG tablet Take 1,000 mg by mouth every 6 hours as needed for mild pain Unknown at PRN    amLODIPine (NORVASC) 5 MG tablet Take 5 mg by mouth daily 9/30/2024    aspirin 81 MG EC tablet Take 81 mg by mouth daily 9/30/2024    glipiZIDE (GLUCOTROL XL) 5 MG 24 hr tablet Take 5 mg by mouth 2 times daily. 9/30/2024    glucose (BD GLUCOSE) 4 g chewable tablet Take 1 tablet by mouth every hour as needed for low blood sugar     metoprolol succinate ER (TOPROL XL) 100 MG 24 hr tablet Take 100 mg by mouth daily. 9/30/2024    torsemide (DEMADEX) 10 MG tablet Take 1 tablet (10 mg) by mouth every 48 hours     vitamin D3 (CHOLECALCIFEROL) 50 mcg (2000 units) tablet Take 1 tablet by mouth daily 9/30/2024

## 2024-10-02 LAB
ALBUMIN SERPL BCG-MCNC: 3.1 G/DL (ref 3.5–5.2)
ALP SERPL-CCNC: 63 U/L (ref 40–150)
ALT SERPL W P-5'-P-CCNC: 12 U/L (ref 0–50)
ANION GAP SERPL CALCULATED.3IONS-SCNC: 11 MMOL/L (ref 7–15)
AST SERPL W P-5'-P-CCNC: 28 U/L (ref 0–45)
BACTERIA UR CULT: ABNORMAL
BILIRUB SERPL-MCNC: 0.4 MG/DL
BUN SERPL-MCNC: 27.1 MG/DL (ref 8–23)
CALCIUM SERPL-MCNC: 8.9 MG/DL (ref 8.8–10.4)
CHLORIDE SERPL-SCNC: 105 MMOL/L (ref 98–107)
CREAT SERPL-MCNC: 1.24 MG/DL (ref 0.51–0.95)
EGFRCR SERPLBLD CKD-EPI 2021: 39 ML/MIN/1.73M2
GLUCOSE BLDC GLUCOMTR-MCNC: 141 MG/DL (ref 70–99)
GLUCOSE BLDC GLUCOMTR-MCNC: 141 MG/DL (ref 70–99)
GLUCOSE BLDC GLUCOMTR-MCNC: 148 MG/DL (ref 70–99)
GLUCOSE BLDC GLUCOMTR-MCNC: 148 MG/DL (ref 70–99)
GLUCOSE BLDC GLUCOMTR-MCNC: 152 MG/DL (ref 70–99)
GLUCOSE BLDC GLUCOMTR-MCNC: 173 MG/DL (ref 70–99)
GLUCOSE SERPL-MCNC: 142 MG/DL (ref 70–99)
HCO3 SERPL-SCNC: 22 MMOL/L (ref 22–29)
HOLD SPECIMEN: NORMAL
PLATELET # BLD AUTO: 219 10E3/UL (ref 150–450)
POTASSIUM SERPL-SCNC: 4.2 MMOL/L (ref 3.4–5.3)
PROT SERPL-MCNC: 6.6 G/DL (ref 6.4–8.3)
SODIUM SERPL-SCNC: 138 MMOL/L (ref 135–145)

## 2024-10-02 PROCEDURE — 250N000013 HC RX MED GY IP 250 OP 250 PS 637: Performed by: HOSPITALIST

## 2024-10-02 PROCEDURE — 250N000011 HC RX IP 250 OP 636: Performed by: HOSPITALIST

## 2024-10-02 PROCEDURE — 99232 SBSQ HOSP IP/OBS MODERATE 35: CPT | Performed by: HOSPITALIST

## 2024-10-02 PROCEDURE — 84155 ASSAY OF PROTEIN SERUM: CPT | Performed by: HOSPITALIST

## 2024-10-02 PROCEDURE — 93010 ELECTROCARDIOGRAM REPORT: CPT | Performed by: INTERNAL MEDICINE

## 2024-10-02 PROCEDURE — 36415 COLL VENOUS BLD VENIPUNCTURE: CPT | Performed by: HOSPITALIST

## 2024-10-02 PROCEDURE — 999N000128 HC STATISTIC PERIPHERAL IV START W/O US GUIDANCE

## 2024-10-02 PROCEDURE — 85049 AUTOMATED PLATELET COUNT: CPT | Performed by: HOSPITALIST

## 2024-10-02 PROCEDURE — 99207 PR APP CREDIT; MD BILLING SHARED VISIT: CPT

## 2024-10-02 PROCEDURE — 250N000013 HC RX MED GY IP 250 OP 250 PS 637

## 2024-10-02 PROCEDURE — 82947 ASSAY GLUCOSE BLOOD QUANT: CPT | Performed by: HOSPITALIST

## 2024-10-02 PROCEDURE — 120N000001 HC R&B MED SURG/OB

## 2024-10-02 PROCEDURE — 93005 ELECTROCARDIOGRAM TRACING: CPT

## 2024-10-02 RX ORDER — MAGNESIUM HYDROXIDE/ALUMINUM HYDROXICE/SIMETHICONE 120; 1200; 1200 MG/30ML; MG/30ML; MG/30ML
15 SUSPENSION ORAL ONCE
Status: COMPLETED | OUTPATIENT
Start: 2024-10-02 | End: 2024-10-02

## 2024-10-02 RX ORDER — CEFEPIME HYDROCHLORIDE 1 G/1
1 INJECTION, POWDER, FOR SOLUTION INTRAMUSCULAR; INTRAVENOUS EVERY 24 HOURS
Status: DISCONTINUED | OUTPATIENT
Start: 2024-10-02 | End: 2024-10-03

## 2024-10-02 RX ORDER — HYDROMORPHONE HCL IN WATER/PF 6 MG/30 ML
0.2 PATIENT CONTROLLED ANALGESIA SYRINGE INTRAVENOUS ONCE
Status: DISCONTINUED | OUTPATIENT
Start: 2024-10-02 | End: 2024-10-02

## 2024-10-02 RX ADMIN — ASPIRIN 81 MG: 81 TABLET, COATED ORAL at 08:58

## 2024-10-02 RX ADMIN — HEPARIN SODIUM 5000 UNITS: 5000 INJECTION, SOLUTION INTRAVENOUS; SUBCUTANEOUS at 18:14

## 2024-10-02 RX ADMIN — CEFEPIME 1 G: 1 INJECTION, POWDER, FOR SOLUTION INTRAMUSCULAR; INTRAVENOUS at 10:20

## 2024-10-02 RX ADMIN — ACETAMINOPHEN 650 MG: 325 TABLET ORAL at 01:40

## 2024-10-02 RX ADMIN — ALUMINUM HYDROXIDE, MAGNESIUM HYDROXIDE, AND SIMETHICONE 15 ML: 200; 200; 20 SUSPENSION ORAL at 04:41

## 2024-10-02 RX ADMIN — AMLODIPINE BESYLATE 5 MG: 5 TABLET ORAL at 08:58

## 2024-10-02 RX ADMIN — METOPROLOL SUCCINATE 100 MG: 50 TABLET, EXTENDED RELEASE ORAL at 08:58

## 2024-10-02 RX ADMIN — SENNOSIDES AND DOCUSATE SODIUM 1 TABLET: 50; 8.6 TABLET ORAL at 21:01

## 2024-10-02 RX ADMIN — SENNOSIDES AND DOCUSATE SODIUM 1 TABLET: 50; 8.6 TABLET ORAL at 08:59

## 2024-10-02 RX ADMIN — HEPARIN SODIUM 5000 UNITS: 5000 INJECTION, SOLUTION INTRAVENOUS; SUBCUTANEOUS at 08:58

## 2024-10-02 RX ADMIN — HEPARIN SODIUM 5000 UNITS: 5000 INJECTION, SOLUTION INTRAVENOUS; SUBCUTANEOUS at 01:29

## 2024-10-02 ASSESSMENT — ACTIVITIES OF DAILY LIVING (ADL)
ADLS_ACUITY_SCORE: 55
ADLS_ACUITY_SCORE: 55
ADLS_ACUITY_SCORE: 46
ADLS_ACUITY_SCORE: 55
ADLS_ACUITY_SCORE: 53
ADLS_ACUITY_SCORE: 55
ADLS_ACUITY_SCORE: 53
ADLS_ACUITY_SCORE: 46
ADLS_ACUITY_SCORE: 46
ADLS_ACUITY_SCORE: 55
ADLS_ACUITY_SCORE: 55
ADLS_ACUITY_SCORE: 46
ADLS_ACUITY_SCORE: 55
ADLS_ACUITY_SCORE: 46
ADLS_ACUITY_SCORE: 55
ADLS_ACUITY_SCORE: 46

## 2024-10-02 NOTE — PLAN OF CARE
Goal Outcome Evaluation:       10/1/24  [id number]      Orientation: Alert to self, Ivorian speaking, unable to utilize  d/t dementia and [place], impulsive at times   Aggression Stop Light: green  Activity: A1-2GBW  Diet/BS Checks: Regular, BG ACHS 132/141  Tele:  none  IV Access/Drains: no PIV-MD aware  Pain Management: Tylenol PRN given 1x  Abnormal VS/Results: VSS,RA  Bowel/Bladder: inc b/b, no bm  Skin/Wounds: scattered bruising  Consults: PT/OT  D/C Disposition: pending    Other Info:   Pulled out PIV. Uneasy and tries to get out of bed multiple times- redirectable at times  -At 0300 appears having chest discomfort and with SOB.  RRT was called- see Provider's notes.    -Bladder scan vol was 560. Straight cath was done with 550 urine output. Able to sleep thereafter. IV re-insertion unsuccessful this shift.

## 2024-10-02 NOTE — PLAN OF CARE
Orientation: alert to self, [place]. Dutch speaking. Interrupter services difficulty using d/t [place] and dementia  Aggression Stop Light: green  Activity: A1-2 gaitbelt and walker  Diet/BS Checks: regular. BG covered as needed  Tele:  n/a  IV Access/Drains: PIV SL w/ int antibiotics  Pain Management: denies  Abnormal VS/Results: VSS on RA  Bowel/Bladder: parikh placed for retention  Skin/Wounds: scattered bruising  Consults: PT/OT  D/C Disposition: pending   Other Info:

## 2024-10-02 NOTE — PROGRESS NOTES
[facility]    Medicine Progress Note - Hospitalist Service    Date of Admission:  9/30/2024    Assessment & Plan   Deborah Zhou is a 89 year old female extremely hard of hearing with PMH significant for dementia, diabetes, hypertension, chronic edema, CKD stage IIIb was brought to ED from her assisted living facility for hallucinations, noted with likely UTI, admitted inpatient 9/30/24.     Suspected hallucinations and acute metabolic encephalopathy secondary to UTI   UTI secondary to Enterobacter cloacae complex  Hyperkalemia, resolved  Hypocalcemia, resolved  Chronic kidney disease, stage IIIb  Dementia  Hearing loss  Patient resides in an assisted-living memory care facility.  Family noted she was hallucinating, seeing people and bugs; had been having poor PO intake but with good appetite one family brings forward.  She was brought to the ED for evaluation.  Initial workup notable for potassium 5.7, creatinine 1.43 (baseline around 1.2), calcium 7, normal pro calcitonin, normal WBC.  UA grossly abnormal.  CT head showed no acute intracranial abnormalities.  Admitted to inpatient.  Started on Rocephin in the ED, continued in the hospital initially, transitioned to cefepime on 10/2/2024 based on urine culture results.  Urine culture growing Enterobacter cloacae complex, susceptibilities pending.  Continue IV fluids for now.  Calcium and potassium within normal limits on 10/1/2024, labs pending on 10/2/2024.  Delirium and fall precautions in place.  PT/OT consulted, appears that she is wheelchair-bound at baseline but does stand to pivot and transfer.  Care transitions consulted regarding discharge planning.  Continue inpatient care.  IV antibiotics switched today due to to culture results.  Mental status still not back to baseline.  Potential discharge in the next day or 2 pending further culture results and improvement in encephalopathy/hallucinations.    Prolonged QTc  EKG in the ED on  9/30/2024 showed low voltage with , occasional PVCs.  Calcium replaced as noted above.  Avoid QTc prolonging medications     Diabetes mellitus  Hemoglobin A1c 12.2% on 3/29/2024.  Hold PTA glipizide.  Monitor blood sugars and use high-dose sliding scale NovoLog as indicated.  Currently on regular diet, will transition to mod carb diet if blood sugars elevated.  Monitor for hypoglycemia.  Blood sugars well-controlled.     Hypertension  Chronic lower extremity edema  Continue PTA amlodipine and Toprol-XL with hold parameters.  As needed IV hydralazine available for significantly elevated blood pressures.  Monitor volume status closely while getting IV fluids.  Hold PTA Demadex for now while getting IV fluids.          Diet: Combination Diet Regular Diet Adult  Room Service    DVT Prophylaxis: Heparin SQ  Ghotra Catheter: Not present  Lines: None     Cardiac Monitoring: None  Code Status: No CPR- Do NOT Intubate      Clinically Significant Risk Factors        # Hyperkalemia: Highest K = 5.7 mmol/L in last 2 days, will monitor as appropriate       # Hypoalbuminemia: Lowest albumin = 2.7 g/dL at 9/30/2024  4:04 PM, will monitor as appropriate     # Hypertension: Noted on problem list               # Financial/Environmental Concerns: none         Disposition Plan     Medically Ready for Discharge: Anticipated in 2-4 Days             Stefan Bailey MD  Hospitalist Service  [facility]  Securely message with [employer] (more info)  Text page via [facility] Paging/Directory   ______________________________________________________________________    Interval History   Deborah Zhou was seen this morning.  Again, difficult to communicate despite use of pocket talker and professional .  Does not answer questions consistently.  She seems to feel okay.  Denies pain.  Plan of care discussed with bedside nurse and her daughter Dinorah.    Physical Exam   Vital Signs: Temp: 96.9  F (36.1  C) Temp src:  Axillary BP: (!) 157/72 Pulse: 71   Resp: 15 SpO2: 95 % O2 Device: None (Room air)    Weight: 124 lbs 4.8 oz    Constitutional: awake, alert, cooperative, no apparent distress, sitting up in a chair  Respiratory: no increased work of breathing, clear to auscultation bilaterally, no crackles or wheezing  Cardiovascular: regular rate and rhythm, normal S1 and S2, no murmur noted  GI: normal bowel sounds, soft, non-distended, non-tender  Skin: warm, dry  Musculoskeletal: no lower extremity pitting edema present  Neurologic: awake, alert, difficult to assess orientation, moves all extremities spontaneously, still seems to be hallucinating at times, responds to some questions in English but not reliably/consistently, did not respond to any of the questions from the professional     Medical Decision Making       40 MINUTES SPENT BY ME on the date of service doing chart review, history, exam, documentation & further activities per the note.      Data   NOTE: Data reviewed over the past 24 hrs contributes toward MDM complexity

## 2024-10-02 NOTE — CODE/RAPID RESPONSE
[facility]  House DEYSI RRT Note  10/2/2024   Time called: 0313  RRT called for: Concern for chest pain  Code status: No CPR- Do NOT Intubate    Assessment & Plan   Deborah Zhou is a 89 year old female extremely hard of hearing with PMH significant for dementia, diabetes, hypertension, chronic edema, CKD stage III was brought to ED from her assisted living facility for hallucinations, noted with likely UTI, admitted inpatient 9/30/24.     I was paged to the bedside to evaluate Ms. Deborah Zhou for an acute episode of grasping at her chest and grimacing.    Upon my arrival the patient was lying supine and appeared comfortable. Patient was tired-appearing and made no objections to my exam.  was not used as the [place]  was unable to understand her dialect and patient was also very confused and could not understand our questions. Patient was warm and dry with 2+ pulses in all extremities. Lung sounds clear, heart tones normal S1 and S2.    Patient made had no signs or symptoms of pain but given recent complaint we will rule out ischemic EKG changes and treat for possible indigestion with GI cocktail. Patient received tylenol about an hour ago and this seems to have ease her discomfort as she is smiling and appearing very comfortable and pleasant during my exam.    Diagnosis:  -- Chest discomfort, seemingly intermittent  -- Urinary retention  No signs of chest discomfort during my visit. Straight cath for bladder scan volume of 560ml    Plan:  -- GI cocktail  -- Start PIV: Unable to do so, vascular access consult for today  -- EKG: No ischemic changes    At the conclusion of this RRT patient was hemodynamically stable and will remain on current unit    Her history is significant for:  No past medical history on file.  No past surgical history on file.    Review of Systems   The 10 point Review of Systems is negative other than noted in the HPI or here.     Allergies    Allergies   Allergen Reactions    Gabapentin        Physical Exam   Physical Exam  Eyes:      Pupils: Pupils are equal, round, and reactive to light.   Cardiovascular:      Rate and Rhythm: Normal rate and regular rhythm.   Pulmonary:      Effort: Pulmonary effort is normal.      Breath sounds: Normal breath sounds.   Abdominal:      General: Abdomen is flat.      Palpations: Abdomen is soft.   Musculoskeletal:         General: Normal range of motion.      Right lower leg: No edema.      Left lower leg: No edema.   Skin:     General: Skin is warm and dry.      Capillary Refill: Capillary refill takes less than 2 seconds.   Neurological:      General: No focal deficit present.      Mental Status: She is alert and oriented to person, place, and time.   Psychiatric:         Mood and Affect: Mood normal.         Vital Signs with Ranges:  Temp:  [97  F (36.1  C)-99.2  F (37.3  C)] 99.2  F (37.3  C)  Pulse:  [74-83] 76  Resp:  [18-20] 18  BP: (141-167)/(69-74) 150/69  SpO2:  [95 %-97 %] 95 %  I/O last 3 completed shifts:  In: 200 [P.O.:200]  Out: 0     Data   Results for orders placed or performed during the hospital encounter of 09/30/24 (from the past 24 hour(s))   CBC with platelets differential    Narrative    The following orders were created for panel order CBC with platelets differential.  Procedure                               Abnormality         Status                     ---------                               -----------         ------                     CBC with platelets and d...[[id number]]                      Final result                 Please view results for these tests on the individual orders.   Basic metabolic panel   Result Value Ref Range    Sodium 140 135 - 145 mmol/L    Potassium 4.5 3.4 - 5.3 mmol/L    Chloride 106 98 - 107 mmol/L    Carbon Dioxide (CO2) 22 22 - 29 mmol/L    Anion Gap 12 7 - 15 mmol/L    Urea Nitrogen 35.9 (H) 8.0 - 23.0 mg/dL    Creatinine 1.39 (H) 0.51 - 0.95 mg/dL    GFR  Estimate 34 (L) >60 mL/min/1.73m2    Calcium 9.2 8.8 - 10.4 mg/dL    Glucose 184 (H) 70 - 99 mg/dL   CBC with platelets and differential   Result Value Ref Range    WBC Count 8.5 4.0 - 11.0 10e3/uL    RBC Count 4.56 3.80 - 5.20 10e6/uL    Hemoglobin 12.9 11.7 - 15.7 g/dL    Hematocrit 38.1 35.0 - 47.0 %    MCV 84 78 - 100 fL    MCH 28.3 26.5 - 33.0 pg    MCHC 33.9 31.5 - 36.5 g/dL    RDW 12.3 10.0 - 15.0 %    Platelet Count 258 150 - 450 10e3/uL    % Neutrophils 67 %    % Lymphocytes 22 %    % Monocytes 7 %    % Eosinophils 3 %    % Basophils 0 %    % Immature Granulocytes 1 %    NRBCs per 100 WBC 0 <1 /100    Absolute Neutrophils 5.8 1.6 - 8.3 10e3/uL    Absolute Lymphocytes 1.9 0.8 - 5.3 10e3/uL    Absolute Monocytes 0.6 0.0 - 1.3 10e3/uL    Absolute Eosinophils 0.2 0.0 - 0.7 10e3/uL    Absolute Basophils 0.0 0.0 - 0.2 10e3/uL    Absolute Immature Granulocytes 0.0 <=0.4 10e3/uL    Absolute NRBCs 0.0 10e3/uL   Glucose by meter   Result Value Ref Range    GLUCOSE BY METER POCT 191 (H) 70 - 99 mg/dL   Glucose by meter   Result Value Ref Range    GLUCOSE BY METER POCT 260 (H) 70 - 99 mg/dL   Glucose by meter   Result Value Ref Range    GLUCOSE BY METER POCT 87 70 - 99 mg/dL   Glucose by meter   Result Value Ref Range    GLUCOSE BY METER POCT 132 (H) 70 - 99 mg/dL   Glucose by meter   Result Value Ref Range    GLUCOSE BY METER POCT 141 (H) 70 - 99 mg/dL   Glucose by meter   Result Value Ref Range    GLUCOSE BY METER POCT 152 (H) 70 - 99 mg/dL   EKG 12-lead, tracing only   Result Value Ref Range    Systolic Blood Pressure  mmHg    Diastolic Blood Pressure  mmHg    Ventricular Rate 73 BPM    Atrial Rate 73 BPM    MO Interval 226 ms    QRS Duration 86 ms     ms    QTc 467 ms    P Axis 85 degrees    R AXIS 70 degrees    T Axis 86 degrees    Interpretation ECG       Sinus rhythm with 1st degree A-V block  Low voltage QRS  Nonspecific ST abnormality  Abnormal ECG  When compared with ECG of 30-Sep-2024 16:25,  Premature  ventricular complexes are no longer Present       COVID-19 PCR Results          3/29/2024    18:17   COVID-19 PCR Results   SARS CoV2 PCR Negative      COVID-19 Antibody Results, Testing for Immunity           No data to display              EKG:  Interpreted by MALLY Cancino CNP  Time reviewed: 0335  Symptoms at time of EKG: None   Rhythm: normal sinus   Rate: Normal  Axis: Normal  Ectopy: none  Conduction: 1st degree AV block  ST Segments/ T Waves: No acute ischemic changes  Q Waves: none  Comparison to prior: Unchanged  Clinical Impression: no acute changes      Time Spent on this Encounter   I spent 30 minutes on the unit/floor managing the care of Deborah Zhou. Over 50% of my time was spent counseling the patient and/or coordinating care regarding services listed in this note.    MALLY Cancino, CNP  Hospitalist - House [facility]  Text me on the Walk-in Appointment Scheduler willy for a textback

## 2024-10-03 LAB
ANION GAP SERPL CALCULATED.3IONS-SCNC: 10 MMOL/L (ref 7–15)
BUN SERPL-MCNC: 25.9 MG/DL (ref 8–23)
CALCIUM SERPL-MCNC: 8.4 MG/DL (ref 8.8–10.4)
CHLORIDE SERPL-SCNC: 102 MMOL/L (ref 98–107)
CREAT SERPL-MCNC: 1.39 MG/DL (ref 0.51–0.95)
EGFRCR SERPLBLD CKD-EPI 2021: 34 ML/MIN/1.73M2
GLUCOSE BLDC GLUCOMTR-MCNC: 123 MG/DL (ref 70–99)
GLUCOSE BLDC GLUCOMTR-MCNC: 142 MG/DL (ref 70–99)
GLUCOSE BLDC GLUCOMTR-MCNC: 146 MG/DL (ref 70–99)
GLUCOSE BLDC GLUCOMTR-MCNC: 162 MG/DL (ref 70–99)
GLUCOSE BLDC GLUCOMTR-MCNC: 186 MG/DL (ref 70–99)
GLUCOSE SERPL-MCNC: 175 MG/DL (ref 70–99)
HCO3 SERPL-SCNC: 22 MMOL/L (ref 22–29)
POTASSIUM SERPL-SCNC: 4.2 MMOL/L (ref 3.4–5.3)
SODIUM SERPL-SCNC: 134 MMOL/L (ref 135–145)

## 2024-10-03 PROCEDURE — 120N000001 HC R&B MED SURG/OB

## 2024-10-03 PROCEDURE — 36415 COLL VENOUS BLD VENIPUNCTURE: CPT | Performed by: HOSPITALIST

## 2024-10-03 PROCEDURE — 250N000013 HC RX MED GY IP 250 OP 250 PS 637: Performed by: HOSPITALIST

## 2024-10-03 PROCEDURE — 99232 SBSQ HOSP IP/OBS MODERATE 35: CPT | Performed by: HOSPITALIST

## 2024-10-03 PROCEDURE — 80048 BASIC METABOLIC PNL TOTAL CA: CPT | Performed by: HOSPITALIST

## 2024-10-03 PROCEDURE — 250N000011 HC RX IP 250 OP 636: Performed by: HOSPITALIST

## 2024-10-03 RX ORDER — GLIPIZIDE 5 MG/1
5 TABLET, FILM COATED, EXTENDED RELEASE ORAL 2 TIMES DAILY
COMMUNITY
Start: 2024-10-03

## 2024-10-03 RX ORDER — CIPROFLOXACIN 500 MG/1
500 TABLET, FILM COATED ORAL DAILY
Status: DISCONTINUED | OUTPATIENT
Start: 2024-10-03 | End: 2024-10-04 | Stop reason: HOSPADM

## 2024-10-03 RX ORDER — CIPROFLOXACIN 500 MG/1
500 TABLET, FILM COATED ORAL DAILY
Qty: 5 TABLET | Refills: 0 | Status: SHIPPED | OUTPATIENT
Start: 2024-10-03 | End: 2024-10-08

## 2024-10-03 RX ADMIN — SENNOSIDES AND DOCUSATE SODIUM 1 TABLET: 50; 8.6 TABLET ORAL at 08:50

## 2024-10-03 RX ADMIN — HEPARIN SODIUM 5000 UNITS: 5000 INJECTION, SOLUTION INTRAVENOUS; SUBCUTANEOUS at 01:24

## 2024-10-03 RX ADMIN — SENNOSIDES AND DOCUSATE SODIUM 2 TABLET: 50; 8.6 TABLET ORAL at 21:00

## 2024-10-03 RX ADMIN — CEFEPIME 1 G: 1 INJECTION, POWDER, FOR SOLUTION INTRAMUSCULAR; INTRAVENOUS at 08:50

## 2024-10-03 RX ADMIN — METOPROLOL SUCCINATE 100 MG: 50 TABLET, EXTENDED RELEASE ORAL at 08:50

## 2024-10-03 RX ADMIN — HEPARIN SODIUM 5000 UNITS: 5000 INJECTION, SOLUTION INTRAVENOUS; SUBCUTANEOUS at 17:59

## 2024-10-03 RX ADMIN — AMLODIPINE BESYLATE 5 MG: 5 TABLET ORAL at 08:50

## 2024-10-03 RX ADMIN — CIPROFLOXACIN HYDROCHLORIDE 500 MG: 500 TABLET, FILM COATED ORAL at 09:57

## 2024-10-03 RX ADMIN — ASPIRIN 81 MG: 81 TABLET, COATED ORAL at 08:50

## 2024-10-03 RX ADMIN — HEPARIN SODIUM 5000 UNITS: 5000 INJECTION, SOLUTION INTRAVENOUS; SUBCUTANEOUS at 08:50

## 2024-10-03 ASSESSMENT — ACTIVITIES OF DAILY LIVING (ADL)
ADLS_ACUITY_SCORE: 54
ADLS_ACUITY_SCORE: 53
ADLS_ACUITY_SCORE: 55
ADLS_ACUITY_SCORE: 53
ADLS_ACUITY_SCORE: 53
ADLS_ACUITY_SCORE: 54
ADLS_ACUITY_SCORE: 54
ADLS_ACUITY_SCORE: 53
ADLS_ACUITY_SCORE: 54
ADLS_ACUITY_SCORE: 54
ADLS_ACUITY_SCORE: 53
ADLS_ACUITY_SCORE: 53
ADLS_ACUITY_SCORE: 54
ADLS_ACUITY_SCORE: 55
ADLS_ACUITY_SCORE: 53
ADLS_ACUITY_SCORE: 54
ADLS_ACUITY_SCORE: 53
ADLS_ACUITY_SCORE: 53

## 2024-10-03 NOTE — PLAN OF CARE
Goal Outcome Evaluation:    10/2/24  [id number]    Orientation: Alert to self, Croatian speaking, unable to utilize  d/t dementia and [place]  Aggression Stop Light: green  Activity: A1-2GBW  Diet/BS Checks: Regular, BG ACHS 173/162  Tele:  none  IV Access/Drains: R PIV SL  Pain Management: denies  Abnormal VS/Results: VSS,exc HTN,RA  Bowel/Bladder: Ghotra in place with adequate UO, No BM  Skin/Wounds: scattered bruising  Consults: PT/OT  D/C Disposition: pending    Others: Conversant and coherent, able to communicate in English this shift, no hallucinations noted, able to follow commands.  Able to sleep well in between cares.

## 2024-10-03 NOTE — PROGRESS NOTES
Care Management Follow Up    Length of Stay (days): 3    Expected Discharge Date: 10/04/2024     Concerns to be Addressed: discharge planning     Patient plan of care discussed at interdisciplinary rounds: Yes    Anticipated Discharge Disposition: Assisted Living     Anticipated Discharge Services: None  Anticipated Discharge DME: None    Patient/family educated on Medicare website which has current facility and service quality ratings: no  Education Provided on the Discharge Plan:    Patient/Family in Agreement with the Plan: yes    Referrals Placed by CM/SW:    Private pay costs discussed: Not applicable    Discussed  Partnership in Safe Discharge Planning  document with patient/family: No     Handoff Completed: No, handoff not indicated or clinically appropriate    Additional Information:  Writer was notified by  that patient will likely be ready for discharge tomorrow. SEFERINO called [facility] and spoke with OLIVER Ordonez. Mery states that patient would be able to come back at anytime tomorrow. SEFERINO called [facility] and set up stretcher ride due to dementia and need for supervision for tomorrow 10/4/24 between 1300-8512. PCS Completed online. Writer called and left a message for daughter Dinorah with an update for transport time for tomorrow. Writer called [facility] and updated Mery. She asked if we can get preliminary orders for patient today faxed to [phone]. Writer updated doctor and asked if that was possible.     Addendum 1010: Writer sent preliminary orders to the facility.     Next Steps: Discharge tomorrow between 6944-2492    RIKA Alberto

## 2024-10-03 NOTE — PLAN OF CARE
Orientation: Alert to self-Malawian speaking. Very [place].   Aggression Stop Light: Green  Activity: Ax1-2 GB/W. Pivots to BSC.  Diet/BS Checks: BS checks ACHS. On room service.  Tele:  N/A  IV Access/Drains: PIV-SL  Pain Management: Denies pain or N/V  Abnormal VS/Results: VSS on RA  Bowel/Bladder: Ghotra removed today-Voiding trial started. Bladder Scanned @ 1845 for 57 mL. No BM or urine output this shift.  Skin/Wounds: Blanchable redness on Coccyx, scattered bruising  Consults: PT, OT  D/C Disposition: Back to [facility] tomorrow between 3845-2377  Other Info: Encouraged fluids. Pt Changes position independently. Needs assistance with feeding. Pt has poor vision.

## 2024-10-03 NOTE — PROGRESS NOTES
[facility]    Medicine Progress Note - Hospitalist Service    Date of Admission:  9/30/2024    Assessment & Plan   Deborah Zhou is a 89 year old female extremely hard of hearing with PMH significant for dementia, diabetes, hypertension, chronic edema, CKD stage IIIb was brought to ED from her assisted living facility for hallucinations, noted with likely UTI, admitted inpatient 9/30/24.     Suspected hallucinations and acute metabolic encephalopathy secondary to UTI   UTI secondary to Enterobacter cloacae complex  Urinary retention  Hyperkalemia, resolved  Hypocalcemia, resolved  Chronic kidney disease, stage IIIb  Dementia  Hearing loss  Patient resides in an assisted-living memory care facility.  Family noted she was hallucinating, seeing people and bugs; had been having poor PO intake but with good appetite one family brings forward.  She was brought to the ED for evaluation.  Initial workup notable for potassium 5.7, creatinine 1.43 (baseline around 1.2), calcium 7, normal pro calcitonin, normal WBC.  UA grossly abnormal.  CT head showed no acute intracranial abnormalities.  Admitted to inpatient.  Started on Rocephin in the ED, continued in the hospital initially, transitioned to cefepime on 10/2/2024 based on urine culture results.  Will transition to oral cipro today, plan for a 7 day course of antibiotics, last day will be 10/8/24.  Urine culture grew Enterobacter cloacae complex, susceptibilities as documented in EPIC.  Ghotra catheter placed on 10/2/24 due to recurrent urinary retention.  Will remove Ghotra catheter today and monitor for any recurrent urinary retention.  IV fluids initially, discontinued on 10/2/24.  Calcium and potassium within normal limits on 10/1 and 10/2.  Delirium and fall precautions in place.  PT/OT consulted, appears that she is wheelchair-bound at baseline but does stand to pivot and transfer.  Care transitions consulted regarding discharge  planning.  Continue inpatient care today. Mental status improving.  Transition to oral antibiotics.  Remove Ghotra catheter and monitor for any recurrent urinary retention.  Anticipate discharge back to her assisted living facility tomorrow.    Prolonged QTc  EKG in the ED on 9/30/2024 showed low voltage with , occasional PVCs.  Calcium replaced as noted above.  Avoid QTc prolonging medications     Diabetes mellitus  Hemoglobin A1c 12.2% on 3/29/2024.  Hold PTA glipizide, will continue to hold upon discharge until follow up with PCP as her blood sugars have been well controlled here without glipizide and she is starting cipro (which can lead to hypoglycemia) for the UTI as noted above.  Monitor blood sugars and use high-dose sliding scale NovoLog as indicated.  Currently on regular diet, will transition to mod carb diet if blood sugars elevated.  Monitor for hypoglycemia.  Blood sugars well-controlled.     Hypertension  Chronic lower extremity edema  Continue PTA amlodipine and Toprol-XL with hold parameters.  As needed IV hydralazine available for significantly elevated blood pressures.  Monitor volume status closely.  Hold PTA Demadex for now, likely resume upon discharge.          Diet: Combination Diet Regular Diet Adult  Room Service  Diet    DVT Prophylaxis: Heparin SQ  Ghotra Catheter: PRESENT, indication: Acute retention or obstruction  Lines: None     Cardiac Monitoring: None  Code Status: No CPR- Do NOT Intubate      Clinically Significant Risk Factors              # Hypoalbuminemia: Lowest albumin = 2.7 g/dL at 9/30/2024  4:04 PM, will monitor as appropriate     # Hypertension: Noted on problem list               # Financial/Environmental Concerns: none         Disposition Plan     Medically Ready for Discharge: Anticipated Tomorrow             Stefan Bailey MD  Hospitalist Service  [facility]  Securely message with [employer] (more info)  Text page via [employer]  Siobhan/Jj   ______________________________________________________________________    Interval History   Deborah Zhou was seen this morning.  She seems to feel okay.  Discussed with nursing, seems that she had a better night last night, seems to be less confused.  Communication continues to be limited due to language barrier, dementia, and difficulty hearing despite use of professional  and a pocket talker.  Tried to update her daughter by phone but just got voicemail, left a message with a brief update and a number for her to call me back.  Plan of care discussed with social work.    Physical Exam   Vital Signs: Temp: 99.8  F (37.7  C) Temp src: Oral BP: (!) 155/65 Pulse: 75   Resp: 18 SpO2: 92 % O2 Device: None (Room air)    Weight: 124 lbs 4.8 oz    Constitutional: awake, alert, cooperative, no apparent distress, laying in the hospital bed  Respiratory: no increased work of breathing, clear to auscultation bilaterally, no crackles or wheezing  Cardiovascular: regular rate and rhythm, normal S1 and S2, no murmur noted  GI: normal bowel sounds, soft, non-distended, non-tender  Skin: warm, dry  Musculoskeletal: no lower extremity pitting edema present  Neurologic: awake, alert, difficult to assess orientation, moves all extremities spontaneously, responds to some questions in English but not reliably/consistently    Medical Decision Making       40 MINUTES SPENT BY ME on the date of service doing chart review, history, exam, documentation & further activities per the note.      Data     I have personally reviewed the following data over the past 24 hrs:    N/A  \   N/A   / 219     N/A N/A N/A /  142 (H)   N/A N/A N/A \     ALT: N/A AST: N/A AP: N/A TBILI: N/A   ALB: N/A TOT PROTEIN: N/A LIPASE: N/A

## 2024-10-03 NOTE — PROVIDER NOTIFICATION
MD Notification    Notified Person: MD    Notified Person Name:  Stefan Bailey    Notification Date/Time: 5:33 PM 10/03/24     Notification Interaction: Change Lane Messaging    Purpose of Notification: SBAR Situation: Nery Zhou Background: UTI/Hallucinations Assessment: Pt has not had her platelets checked today. Heparin is due, would you like me to continue with administration?     Orders Received: Yes, can give heparin as ordered. Is she having more hallucinations.    -She does not seem to. She was very pleasant since I got her at 3. She is actually speaking English a bit too.    Comments:

## 2024-10-03 NOTE — PLAN OF CARE
Mental Status: Alert to self ([place])  Activity/dangle: Ast of 1-2 GB/W  Diet: Regular diet, very poor intake  Pain: Denies pain  Parikh/Voiding: Removed parikh per order. BS at 1405 for 2 mL  Tele/Restraints/Iso: N/A  02/LDA: Room air. IV saline locked  D/C Date: Possible discharge 10/4/24  Other Info: Luxembourgish speaking used interperter services and pt able to communicate in English at times.

## 2024-10-04 VITALS
TEMPERATURE: 99.6 F | HEART RATE: 77 BPM | WEIGHT: 124.3 LBS | OXYGEN SATURATION: 92 % | DIASTOLIC BLOOD PRESSURE: 67 MMHG | BODY MASS INDEX: 23.47 KG/M2 | HEIGHT: 61 IN | RESPIRATION RATE: 16 BRPM | SYSTOLIC BLOOD PRESSURE: 150 MMHG

## 2024-10-04 LAB
ATRIAL RATE - MUSE: 73 BPM
DIASTOLIC BLOOD PRESSURE - MUSE: NORMAL MMHG
GLUCOSE BLDC GLUCOMTR-MCNC: 130 MG/DL (ref 70–99)
GLUCOSE BLDC GLUCOMTR-MCNC: 146 MG/DL (ref 70–99)
INTERPRETATION ECG - MUSE: NORMAL
P AXIS - MUSE: 85 DEGREES
PR INTERVAL - MUSE: 226 MS
QRS DURATION - MUSE: 86 MS
QT - MUSE: 424 MS
QTC - MUSE: 467 MS
R AXIS - MUSE: 70 DEGREES
SYSTOLIC BLOOD PRESSURE - MUSE: NORMAL MMHG
T AXIS - MUSE: 86 DEGREES
VENTRICULAR RATE- MUSE: 73 BPM

## 2024-10-04 PROCEDURE — 250N000013 HC RX MED GY IP 250 OP 250 PS 637: Performed by: HOSPITALIST

## 2024-10-04 PROCEDURE — 99239 HOSP IP/OBS DSCHRG MGMT >30: CPT | Performed by: HOSPITALIST

## 2024-10-04 PROCEDURE — 250N000011 HC RX IP 250 OP 636: Performed by: HOSPITALIST

## 2024-10-04 RX ADMIN — ACETAMINOPHEN 650 MG: 325 TABLET ORAL at 06:36

## 2024-10-04 RX ADMIN — HEPARIN SODIUM 5000 UNITS: 5000 INJECTION, SOLUTION INTRAVENOUS; SUBCUTANEOUS at 08:59

## 2024-10-04 RX ADMIN — HEPARIN SODIUM 5000 UNITS: 5000 INJECTION, SOLUTION INTRAVENOUS; SUBCUTANEOUS at 02:29

## 2024-10-04 RX ADMIN — ASPIRIN 81 MG: 81 TABLET, COATED ORAL at 08:59

## 2024-10-04 RX ADMIN — CIPROFLOXACIN HYDROCHLORIDE 500 MG: 500 TABLET, FILM COATED ORAL at 08:59

## 2024-10-04 RX ADMIN — SENNOSIDES AND DOCUSATE SODIUM 2 TABLET: 50; 8.6 TABLET ORAL at 08:58

## 2024-10-04 RX ADMIN — METOPROLOL SUCCINATE 100 MG: 50 TABLET, EXTENDED RELEASE ORAL at 08:59

## 2024-10-04 RX ADMIN — AMLODIPINE BESYLATE 5 MG: 5 TABLET ORAL at 08:59

## 2024-10-04 ASSESSMENT — ACTIVITIES OF DAILY LIVING (ADL)
ADLS_ACUITY_SCORE: 54
ADLS_ACUITY_SCORE: 56
ADLS_ACUITY_SCORE: 54
ADLS_ACUITY_SCORE: 56
ADLS_ACUITY_SCORE: 56

## 2024-10-04 NOTE — PLAN OF CARE
Goal Outcome Evaluation:    10/3/24  [id number]     Orientation: Alert to self, Pashto speaking, unable to utilize  d/t dementia and [place]  Aggression Stop Light: green  Activity: A1-2 GBW pivots to BSC  Diet/BS Checks: Regular-encouraged to increase fluid intake, BG ACHS 123/130  Tele:  none  IV Access/Drains: L PIV SL  Pain Management: denies  Abnormal VS/Results: VSS,exc HTN,RA  Bowel/Bladder: Bladder scan volume 125/320-unable to void after she was transferred to [facility] -straight cath 1x, no BM-scheduled Senna 2 tabs given   Skin/Wounds: scattered bruising  Consults: PT/OT  D/C Disposition: plan to discharge back to [facility]  10/4/24-see sw notes-transpo in arrange at 8089-0598    Addendum: Tmax 100.4 at 0623. Provider notified. Tylenol given. At 0638-BS volume 198.

## 2024-10-04 NOTE — PROVIDER NOTIFICATION
MD Notification    Notified Person: MD    Notified Person Name:    Notification Date/Time:  10/4/24 0627     Notification Interaction: Vocera    Purpose of Notification: FYI: New fever T100.4. Treated with UTI, currently on Cipro 500mg daily. Been retaining urine post catheter removal. Straight cath 1x. Will give Tylenol. Any other recommendations?     Also, no bm since admission, scheduled senna has been given. Noted poor appetite.     Orders Received:     Comments: no changes

## 2024-10-04 NOTE — PLAN OF CARE
Patient discharged at 11:07 AM to Discharged to assisted living.  IV was discontinued. Pain at time of discharge was 0/10. Belongings returned to patient.  Discharge instructions and medications reviewed with patient.  Patient verbalized understanding and all questions were answered. At time of discharge, patient condition was stable and left the unit via stretcher escorted by [place] EMT.

## 2024-10-04 NOTE — CONSULTS
Care Management Discharge Note    Discharge Date: 10/04/2024       Discharge Disposition: Assisted Living    Discharge Services: Homecare through [facility] RN/PT. Message left 10/4 at 10:00 to confirm they can see orders in epic     Discharge DME: None    Discharge Transportation: health plan transportation [facility] stretcher ride between 10:37-11:22    Private pay costs discussed: Not applicable    Does the patient's insurance plan have a 3 day qualifying hospital stay waiver?  No    PAS Confirmation Code:  n/a  Patient/family educated on Medicare website which has current facility and service quality ratings: no    Education Provided on the Discharge Plan:  yes  Persons Notified of Discharge Plans: Rounding provider, Daughter by phone assisted by phone   Patient/Family in Agreement with the Plan: yes    Handoff Referral Completed: No, handoff not indicated or clinically appropriate    Additional Information:  Patient medically cleared for discharge to assisted with parikh and homecare RN/PT. Writer faxed orders and updated assisted by phone with homecare agency. Message left with homecare agency to confirm that they have access to [facility] and to provide assisted information.  Writer called Daughter Dinorah and she is aware of the plan. Dinorah is aware that patient will most likely need to be assessed by primary care and urology for ongoing recommendations.  Dinorah had no further questions.    Lynda Cavanaugh RN, BSN, ACM   Care Transitions Specialist  [facility]  Care Transitions Specialist  [address] [place]. [address]  [email or web address]  Office: [phone] Fax: [phone]  [facility]

## 2024-10-04 NOTE — DISCHARGE SUMMARY
[facility]  Hospitalist Discharge Summary      Date of Admission:  9/30/2024  Date of Discharge:  10/4/2024  Discharging Provider: Stefan Bailey MD  Discharge Service: Hospitalist Service    Discharge Diagnoses   Hallucinations and acute metabolic encephalopathy secondary to UTI   UTI secondary to Enterobacter cloacae complex  Urinary retention  Hyperkalemia, resolved  Hypocalcemia, resolved  Chronic kidney disease, stage IIIb  Dementia  Hearing loss  Prolonged QTc  Diabetes mellitus  Hypertension  Chronic lower extremity edema    Clinically Significant Risk Factors          Follow-ups Needed After Discharge   Follow-up Appointments     Follow-up and recommended labs and tests       Follow up with primary care provider, [facility], within   7 days for hospital follow up.  The following labs/tests are recommended:   BMP.  Follow up with Urology (referral placed) for urinary retention and Ghotra   catheter management.            Discharge Disposition   Discharged to assisted living  Condition at discharge: Stable    Hospital Course   Deborah Zhou is a 89 year old female extremely hard of hearing with PMH significant for dementia, diabetes, hypertension, chronic edema, CKD stage IIIb was brought to ED from her assisted living facility for hallucinations, noted with likely UTI, admitted inpatient 9/30/24.     Hallucinations and acute metabolic encephalopathy secondary to UTI   UTI secondary to Enterobacter cloacae complex  Urinary retention  Hyperkalemia, resolved  Hypocalcemia, resolved  Chronic kidney disease, stage IIIb  Dementia  Hearing loss  Patient resides in an assisted-living memory care facility.  Family noted she was hallucinating, seeing people and bugs; had been having poor PO intake but with good appetite one family brings forward.  She was brought to the ED for evaluation.  Initial workup notable for potassium 5.7, creatinine 1.43 (baseline around 1.2),  calcium 7, normal pro calcitonin, normal WBC.  UA grossly abnormal.  CT head showed no acute intracranial abnormalities.  Admitted to inpatient.  Started on Rocephin in the ED, continued in the hospital initially, transitioned to cefepime on 10/2/2024 based on urine culture results.  Transitioned to oral cipro on 10/3/24, plan for a 7 day course of antibiotics, last day will be 10/8/24.  Urine culture grew Enterobacter cloacae complex, susceptibilities as documented in EPIC.  Ghotra catheter placed on 10/2/24 due to recurrent urinary retention.  Failed voiding trial and Ghotra catheter was replaced on 10/4/24. Plan to discharge with Ghotra catheter in place and outpatient Urology follow up.  IV fluids initially, discontinued on 10/2/24.  Calcium and potassium within normal limits after replacement.  Delirium and fall precautions in place.  PT/OT consulted, appears that she is wheelchair-bound at baseline but does stand to pivot and transfer.  Care transitions consulted regarding discharge planning.  Overall mental status much improved, although difficult to fully evaluate due to difficulty hearing and language barrier.  Transitioned to oral antibiotics on 10/3/24.  Tolerating oral intake.  Failed voiding trial, Ghotra catheter replaced on 10/4/24.  Discharge back to assisted living facility today.  Follow up with PCP.  Follow up with Urology regarding urinary retention and Ghotra catheter management.    Prolonged QTc  EKG in the ED on 9/30/2024 showed low voltage with , occasional PVCs.  Calcium replaced as noted above.  Avoid QTc prolonging medications     Diabetes mellitus  Hemoglobin A1c 12.2% on 3/29/2024.  Hold PTA glipizide, will continue to hold upon discharge until follow up with PCP as her blood sugars have been well controlled here without glipizide and she is starting cipro (which can lead to hypoglycemia) for the UTI as noted above.  Monitored blood sugars and used sliding scale NovoLog as indicated  while in the hospital.  Regular diet.  Blood sugars were well controlled while in the hospital.     Hypertension  Chronic lower extremity edema  Continue PTA amlodipine and Toprol-XL with hold parameters.  As needed IV hydralazine was available in the hospital for significantly elevated blood pressures.  Monitored volume status closely.  Held PTA Demadex while in the hospital, will resume upon discharge.    Consultations This Hospital Stay   PHARMACY IP CONSULT  PHYSICAL THERAPY ADULT IP CONSULT  OCCUPATIONAL THERAPY ADULT IP CONSULT  CARE MANAGEMENT / SOCIAL WORK IP CONSULT  VASCULAR ACCESS ADULT IP CONSULT  NURSING TO CONSULT FOR VASCULAR ACCESS CARE IP CONSULT    Code Status   No CPR- Do NOT Intubate    Time Spent on this Encounter   I, Stefan Bailey MD, personally saw the patient today and spent greater than 30 minutes discharging this patient.       Stefan Bailey MD  [place] [address] ONCOLOGY  [address] [place] [address], [address]  [facility] [address]  Phone: [phone]  ______________________________________________________________________    Physical Exam   Vital Signs: Temp: 99.6  F (37.6  C) Temp src: Oral BP: (!) 150/67 Pulse: 77   Resp: 16 SpO2: 92 % O2 Device: None (Room air)    Weight: 124 lbs 4.8 oz  Constitutional: awake, alert, cooperative, no apparent distress, laying in the hospital bed  Respiratory: no increased work of breathing, clear to auscultation bilaterally, no crackles or wheezing  Cardiovascular: regular rate and rhythm, normal S1 and S2, no murmur noted  GI: normal bowel sounds, soft, non-distended, non-tender  Skin: warm, dry  Musculoskeletal: no lower extremity pitting edema present  Neurologic: awake, alert, difficult to assess orientation, moves all extremities spontaneously, responds to some questions in English but not reliably/consistently       Primary Care Physician   [facility]    Discharge Orders      Adult Urology [facility] Referral      [place]  Care Referral      Reason for your hospital stay    Acute encephalopathy due to Enterobacter UTI.     Activity    Your activity upon discharge: activity as tolerated with assistance.     Ghotra catheter    To straight gravity drainage. Change catheter every 2 weeks and PRN for leaking or decreased urine output with signs of bladder distention.     Follow-up and recommended labs and tests     Follow up with primary care provider, [facility], within 7 days for hospital follow up.  The following labs/tests are recommended: BMP.  Follow up with Urology (referral placed) for urinary retention and Ghotra catheter management.     Diet    Follow this diet upon discharge: Regular Diet Adult     Significant Results and Procedures   Most Recent 3 CBC's:  Recent Labs   Lab Test 10/02/24  2316 10/01/24  0535 09/30/24  1604 03/29/24  1740   WBC  --  8.5 6.2 6.2   HGB  --  12.9 12.0 13.9   MCV  --  84 85 84    258 227 215     Most Recent 3 BMP's:  Recent Labs   Lab Test 10/04/24  0747 10/04/24  0227 10/03/24  2153 10/03/24  1155 10/03/24  0928 10/02/24  1126 10/02/24  0847 10/01/24  0739 10/01/24  0535   NA  --   --   --   --  134*  --  138  --  140   POTASSIUM  --   --   --   --  4.2  --  4.2  --  4.5   CHLORIDE  --   --   --   --  102  --  105  --  106   CO2  --   --   --   --  22  --  22  --  22   BUN  --   --   --   --  25.9*  --  27.1*  --  35.9*   CR  --   --   --   --  1.39*  --  1.24*  --  1.39*   ANIONGAP  --   --   --   --  10  --  11  --  12   RADHA  --   --   --   --  8.4*  --  8.9  --  9.2   * 130* 123*   < > 175*   < > 142*   < > 184*    < > = values in this interval not displayed.     Results for orders placed or performed during the hospital encounter of 09/30/24   CT Head w/o Contrast    Narrative    EXAM: CT HEAD W/O CONTRAST  LOCATION: [facility]  DATE: 9/30/2024    INDICATION: ams  COMPARISON: 7/13/2021  TECHNIQUE: Routine CT Head without IV contrast.  Multiplanar reformats. Dose reduction techniques were used.    FINDINGS:  INTRACRANIAL CONTENTS: No intracranial hemorrhage, extraaxial collection, or mass effect.  No CT evidence of acute infarct. Moderate presumed chronic small vessel ischemic changes. There is intracranial atherosclerosis. Mild to moderate generalized   volume loss. No hydrocephalus.     VISUALIZED ORBITS/SINUSES/MASTOIDS: Prior bilateral cataract surgery. Visualized portions of the orbits are otherwise unremarkable. No paranasal sinus mucosal disease. No middle ear or mastoid effusion.    BONES/SOFT TISSUES: No acute abnormality.      Impression    IMPRESSION:  1.  No CT evidence for acute intracranial process.  2.  Brain atrophy and presumed chronic microvascular ischemic changes as above.     Discharge Medications   Current Discharge Medication List        START taking these medications    Details   ciprofloxacin (CIPRO) 500 MG tablet Take 1 tablet (500 mg) by mouth daily for 5 days.  Qty: 5 tablet, Refills: 0    Associated Diagnoses: Acute UTI           CONTINUE these medications which have CHANGED    Details   glipiZIDE (GLUCOTROL XL) 5 MG 24 hr tablet Take 1 tablet (5 mg) by mouth 2 times daily. --- 10/4/24 --- HOLD until follow up with primary care provider. ---           CONTINUE these medications which have NOT CHANGED    Details   acetaminophen (TYLENOL) 500 MG tablet Take 1,000 mg by mouth every 6 hours as needed for mild pain      amLODIPine (NORVASC) 5 MG tablet Take 5 mg by mouth daily      aspirin 81 MG EC tablet Take 81 mg by mouth daily      glucose (BD GLUCOSE) 4 g chewable tablet Take 1 tablet by mouth every hour as needed for low blood sugar  Qty: 100 tablet, Refills: 0    Comments: Future refills by PCP Dr. [facility] with phone number [phone].  Associated Diagnoses: Type 2 diabetes, HbA1C goal < 8% (H)      metoprolol succinate ER (TOPROL XL) 100 MG 24 hr tablet Take 100 mg by mouth daily.      torsemide  (DEMADEX) 10 MG tablet Take 1 tablet (10 mg) by mouth every 48 hours  Qty: 30 tablet, Refills: 0    Associated Diagnoses: Stage 3 chronic kidney disease, unspecified whether stage 3a or 3b CKD (H)      vitamin D3 (CHOLECALCIFEROL) 50 mcg (2000 units) tablet Take 1 tablet by mouth daily      ORDER FOR DME Equipment being ordered: post op shoe  Qty: 1 Device, Refills: 0    Associated Diagnoses: Injury of toe, left, initial encounter; Hematoma of toe, left, initial encounter           Allergies   Allergies   Allergen Reactions    Gabapentin

## 2024-10-09 ENCOUNTER — DOCUMENTATION ONLY (OUTPATIENT)
Dept: OTHER | Facility: CLINIC | Age: 89
End: 2024-10-09
Payer: COMMERCIAL